# Patient Record
Sex: FEMALE | Race: WHITE | Employment: UNEMPLOYED | URBAN - METROPOLITAN AREA
[De-identification: names, ages, dates, MRNs, and addresses within clinical notes are randomized per-mention and may not be internally consistent; named-entity substitution may affect disease eponyms.]

---

## 2017-01-03 ENCOUNTER — HOSPITAL ENCOUNTER (EMERGENCY)
Facility: HOSPITAL | Age: 27
Discharge: HOME | End: 2017-01-04
Attending: EMERGENCY MEDICINE

## 2017-01-03 DIAGNOSIS — N12 PYELONEPHRITIS: Primary | ICD-10-CM

## 2017-01-03 PROCEDURE — 96376 TX/PRO/DX INJ SAME DRUG ADON: CPT

## 2017-01-03 PROCEDURE — 96374 THER/PROPH/DIAG INJ IV PUSH: CPT

## 2017-01-03 PROCEDURE — 81025 URINE PREGNANCY TEST: CPT

## 2017-01-03 PROCEDURE — 96375 TX/PRO/DX INJ NEW DRUG ADDON: CPT

## 2017-01-03 PROCEDURE — 96365 THER/PROPH/DIAG IV INF INIT: CPT

## 2017-01-03 PROCEDURE — 96366 THER/PROPH/DIAG IV INF ADDON: CPT

## 2017-01-03 PROCEDURE — 99284 EMERGENCY DEPT VISIT MOD MDM: CPT

## 2017-01-03 RX ORDER — SULFAMETHOXAZOLE AND TRIMETHOPRIM 400; 80 MG/1; MG/1
1 TABLET ORAL 2 TIMES DAILY
COMMUNITY
End: 2017-01-09 | Stop reason: DRUGHIGH

## 2017-01-04 ENCOUNTER — APPOINTMENT (OUTPATIENT)
Dept: CT IMAGING | Facility: HOSPITAL | Age: 27
End: 2017-01-04
Attending: EMERGENCY MEDICINE

## 2017-01-04 VITALS
SYSTOLIC BLOOD PRESSURE: 106 MMHG | BODY MASS INDEX: 25.9 KG/M2 | WEIGHT: 165 LBS | RESPIRATION RATE: 16 BRPM | OXYGEN SATURATION: 97 % | TEMPERATURE: 99 F | HEIGHT: 67 IN | DIASTOLIC BLOOD PRESSURE: 67 MMHG | HEART RATE: 95 BPM

## 2017-01-04 LAB
ALBUMIN SERPL-MCNC: 4.1 G/DL (ref 3.5–4.8)
ALP LIVER SERPL-CCNC: 42 U/L (ref 37–98)
ALT SERPL-CCNC: 46 U/L (ref 14–54)
AST SERPL-CCNC: 18 U/L (ref 15–41)
BASOPHILS # BLD AUTO: 0.02 X10(3) UL (ref 0–0.1)
BASOPHILS NFR BLD AUTO: 0.2 %
BILIRUB SERPL-MCNC: 0.4 MG/DL (ref 0.1–2)
BILIRUB UR QL STRIP.AUTO: NEGATIVE
BUN BLD-MCNC: 11 MG/DL (ref 8–20)
CALCIUM BLD-MCNC: 8.7 MG/DL (ref 8.3–10.3)
CHLORIDE: 105 MMOL/L (ref 101–111)
CO2: 29 MMOL/L (ref 22–32)
COLOR UR AUTO: YELLOW
CREAT BLD-MCNC: 0.63 MG/DL (ref 0.55–1.02)
EOSINOPHIL # BLD AUTO: 0.05 X10(3) UL (ref 0–0.3)
EOSINOPHIL NFR BLD AUTO: 0.4 %
ERYTHROCYTE [DISTWIDTH] IN BLOOD BY AUTOMATED COUNT: 12.1 % (ref 11.5–16)
GLUCOSE BLD-MCNC: 116 MG/DL (ref 70–99)
GLUCOSE UR STRIP.AUTO-MCNC: NEGATIVE MG/DL
HCG QUANTITATIVE: <1 MIU/ML (ref ?–1)
HCT VFR BLD AUTO: 37.1 % (ref 34–50)
HGB BLD-MCNC: 13 G/DL (ref 12–16)
IMMATURE GRANULOCYTE COUNT: 0.03 X10(3) UL (ref 0–1)
IMMATURE GRANULOCYTE RATIO %: 0.3 %
KETONES UR STRIP.AUTO-MCNC: NEGATIVE MG/DL
LIPASE: 106 U/L (ref 73–393)
LYMPHOCYTES # BLD AUTO: 1.15 X10(3) UL (ref 0.9–4)
LYMPHOCYTES NFR BLD AUTO: 10.1 %
M PROTEIN MFR SERPL ELPH: 7.3 G/DL (ref 6.1–8.3)
MCH RBC QN AUTO: 30.7 PG (ref 27–33.2)
MCHC RBC AUTO-ENTMCNC: 35 G/DL (ref 31–37)
MCV RBC AUTO: 87.7 FL (ref 81–100)
MONOCYTES # BLD AUTO: 0.62 X10(3) UL (ref 0.1–0.6)
MONOCYTES NFR BLD AUTO: 5.4 %
NEUTROPHIL ABS PRELIM: 9.56 X10 (3) UL (ref 1.3–6.7)
NEUTROPHILS # BLD AUTO: 9.56 X10(3) UL (ref 1.3–6.7)
NEUTROPHILS NFR BLD AUTO: 83.6 %
NITRITE UR QL STRIP.AUTO: POSITIVE
PH UR STRIP.AUTO: 7 [PH] (ref 4.5–8)
PLATELET # BLD AUTO: 155 10(3)UL (ref 150–450)
POCT URINE PREGNANCY: NEGATIVE
POTASSIUM SERPL-SCNC: 3.7 MMOL/L (ref 3.6–5.1)
PROT UR STRIP.AUTO-MCNC: 100 MG/DL
RBC # BLD AUTO: 4.23 X10(6)UL (ref 3.8–5.1)
RBC #/AREA URNS AUTO: >10 /HPF
RED CELL DISTRIBUTION WIDTH-SD: 38.7 FL (ref 35.1–46.3)
SODIUM SERPL-SCNC: 140 MMOL/L (ref 136–144)
SP GR UR STRIP.AUTO: 1.01 (ref 1–1.03)
UROBILINOGEN UR STRIP.AUTO-MCNC: <2 MG/DL
WBC # BLD AUTO: 11.4 X10(3) UL (ref 4–13)
WBC #/AREA URNS AUTO: >50 /HPF
YEAST URINE: PRESENT

## 2017-01-04 PROCEDURE — 80053 COMPREHEN METABOLIC PANEL: CPT | Performed by: EMERGENCY MEDICINE

## 2017-01-04 PROCEDURE — 85025 COMPLETE CBC W/AUTO DIFF WBC: CPT | Performed by: EMERGENCY MEDICINE

## 2017-01-04 PROCEDURE — 87088 URINE BACTERIA CULTURE: CPT | Performed by: EMERGENCY MEDICINE

## 2017-01-04 PROCEDURE — 84703 CHORIONIC GONADOTROPIN ASSAY: CPT | Performed by: EMERGENCY MEDICINE

## 2017-01-04 PROCEDURE — 83690 ASSAY OF LIPASE: CPT | Performed by: EMERGENCY MEDICINE

## 2017-01-04 PROCEDURE — 87086 URINE CULTURE/COLONY COUNT: CPT | Performed by: EMERGENCY MEDICINE

## 2017-01-04 PROCEDURE — 87186 SC STD MICRODIL/AGAR DIL: CPT | Performed by: EMERGENCY MEDICINE

## 2017-01-04 PROCEDURE — 74176 CT ABD & PELVIS W/O CONTRAST: CPT

## 2017-01-04 PROCEDURE — 81001 URINALYSIS AUTO W/SCOPE: CPT | Performed by: EMERGENCY MEDICINE

## 2017-01-04 RX ORDER — ONDANSETRON 2 MG/ML
4 INJECTION INTRAMUSCULAR; INTRAVENOUS ONCE
Status: COMPLETED | OUTPATIENT
Start: 2017-01-04 | End: 2017-01-04

## 2017-01-04 RX ORDER — HYDROCODONE BITARTRATE AND ACETAMINOPHEN 5; 325 MG/1; MG/1
1-2 TABLET ORAL EVERY 4 HOURS PRN
Qty: 20 TABLET | Refills: 0 | Status: SHIPPED | OUTPATIENT
Start: 2017-01-04 | End: 2017-01-09 | Stop reason: ALTCHOICE

## 2017-01-04 RX ORDER — SULFAMETHOXAZOLE AND TRIMETHOPRIM 800; 160 MG/1; MG/1
1 TABLET ORAL 2 TIMES DAILY
Qty: 6 TABLET | Refills: 0 | Status: SHIPPED | OUTPATIENT
Start: 2017-01-04 | End: 2017-01-08

## 2017-01-04 RX ORDER — ONDANSETRON 2 MG/ML
4 INJECTION INTRAMUSCULAR; INTRAVENOUS ONCE
Status: DISCONTINUED | OUTPATIENT
Start: 2017-01-04 | End: 2017-01-04

## 2017-01-04 RX ORDER — HYDROMORPHONE HYDROCHLORIDE 1 MG/ML
1 INJECTION, SOLUTION INTRAMUSCULAR; INTRAVENOUS; SUBCUTANEOUS EVERY 30 MIN PRN
Status: DISCONTINUED | OUTPATIENT
Start: 2017-01-04 | End: 2017-01-04

## 2017-01-04 NOTE — ED PROVIDER NOTES
Patient Seen in: [facility] Emergency Department    History   Patient presents with:  Abdomen/Flank Pain (GI/)  Urinary Symptoms (urologic)    Stated Complaint: abd/back pain- recent uti    HPI    Patient is a 69-year-old female comes in to emerge uti  Other systems are as noted in HPI. Constitutional and vital signs reviewed. All other systems reviewed and negative except as noted above. PSFH elements reviewed from today and agreed except as otherwise stated in HPI.     Physical Exam Nitrite Urine Positive (*)     Leukocyte Esterase Urine Moderate (*)     WBC Urine >50 (*)     RBC URINE >10 (*)     Bacteria Urine Rare (*)     Squamous Epi.  Cells Moderate (*)     Yeast Urine Present (*)     All other components within normal limits Add-vantage (0 g Intravenous Stopped 1/4/17 6614)       MDM     28-year-old female with pyelonephritis. No evidence for stone. Patient is going to be given Norco for home. Continue Bactrim.   She was given prescription for 4 more days of Bactrim to prolo

## 2017-01-04 NOTE — ED NOTES
Assumed care of patient, awaiting ct scan. States pain to abdomen is resolved, still with pain to right lower back 4/10.

## 2017-01-04 NOTE — ED INITIAL ASSESSMENT (HPI)
Pt with recent uti, now with right lower back pain. Seen at immediate care, urine checked, started on antibiotic x 1 dose. +nausea, denies emesis.

## 2017-01-09 ENCOUNTER — OFFICE VISIT (OUTPATIENT)
Dept: INTERNAL MEDICINE CLINIC | Facility: CLINIC | Age: 27
End: 2017-01-09

## 2017-01-09 VITALS
WEIGHT: 168 LBS | HEIGHT: 67 IN | SYSTOLIC BLOOD PRESSURE: 110 MMHG | TEMPERATURE: 98 F | DIASTOLIC BLOOD PRESSURE: 64 MMHG | RESPIRATION RATE: 12 BRPM | BODY MASS INDEX: 26.37 KG/M2 | HEART RATE: 74 BPM | OXYGEN SATURATION: 97 %

## 2017-01-09 DIAGNOSIS — N12 PYELONEPHRITIS: Primary | ICD-10-CM

## 2017-01-09 PROCEDURE — 99213 OFFICE O/P EST LOW 20 MIN: CPT | Performed by: INTERNAL MEDICINE

## 2017-01-09 RX ORDER — TRAMADOL HYDROCHLORIDE 50 MG/1
50 TABLET ORAL EVERY 6 HOURS PRN
Qty: 30 TABLET | Refills: 1 | Status: SHIPPED | OUTPATIENT
Start: 2017-01-09 | End: 2017-05-22

## 2017-01-09 RX ORDER — SULFAMETHOXAZOLE AND TRIMETHOPRIM 800; 160 MG/1; MG/1
1 TABLET ORAL 2 TIMES DAILY
Qty: 10 TABLET | Refills: 0 | Status: SHIPPED | OUTPATIENT
Start: 2017-01-09 | End: 2017-01-14

## 2017-01-09 NOTE — PROGRESS NOTES
Anabela Noel is a 23year old female. HPI:   Patient presents with:  Er F/u: kidney problems  Patient presents for follow up from her ED visit on 1/3/16. She had polyuria/dysuria, CVA tenderness at that time.   CT scan, UA were consistent with cyst : 170 lb  04/13/15 : 168 lb    EXAM:   /64 mmHg  Pulse 74  Temp(Src) 98.2 °F (36.8 °C) (Oral)  Resp 12  Ht 67\"  Wt 168 lb  BMI 26.31 kg/m2  SpO2 97%  LMP 01/03/2017  GENERAL: Alert and oriented, well developed, well nourished,in no apparent distress

## 2017-01-09 NOTE — PATIENT INSTRUCTIONS
For your urinary infection/back pain:  - we will do a longer course of antibiotics, at a higher dose. Take 1 tablet twice daily for the next 5 days. - I have also prescribed a different pain medication (Tramadol). Take 1 tablet every 8 hours as needed.

## 2017-05-22 ENCOUNTER — OFFICE VISIT (OUTPATIENT)
Dept: INTERNAL MEDICINE CLINIC | Facility: CLINIC | Age: 27
End: 2017-05-22

## 2017-05-22 VITALS
OXYGEN SATURATION: 99 % | SYSTOLIC BLOOD PRESSURE: 110 MMHG | BODY MASS INDEX: 28.7 KG/M2 | HEART RATE: 56 BPM | DIASTOLIC BLOOD PRESSURE: 68 MMHG | HEIGHT: 65 IN | WEIGHT: 172.25 LBS | TEMPERATURE: 99 F | RESPIRATION RATE: 12 BRPM

## 2017-05-22 DIAGNOSIS — R41.840 LACK OF CONCENTRATION: ICD-10-CM

## 2017-05-22 DIAGNOSIS — Z00.00 LABORATORY EXAMINATION ORDERED AS PART OF A ROUTINE GENERAL MEDICAL EXAMINATION: ICD-10-CM

## 2017-05-22 DIAGNOSIS — Z00.00 ROUTINE GENERAL MEDICAL EXAMINATION AT A HEALTH CARE FACILITY: Primary | ICD-10-CM

## 2017-05-22 PROCEDURE — 99395 PREV VISIT EST AGE 18-39: CPT | Performed by: FAMILY MEDICINE

## 2017-05-22 NOTE — PROGRESS NOTES
HPI:   Gabriela Leonardo is a 33year old female who presents for a complete physical exam. Pt sees a gyne for her pap smears . Patient complains of possible concentration, focus issues. Pt states in the past month it has gotten worse.  Pt is stressed, plann History   Problem Relation Age of Onset   • Cancer Maternal Grandmother    • Other[other] [OTHER] Paternal Grandfather       Social History:     Smoking Status: Never Smoker                      Smokeless Status: Never Used                        Alcohol U physical exam.  Self breast exam explained. Health maintenance, will check fasting Labs. Await results, if all wnl, will refer for ADD evaluation.   Pt info handouts given for: exercise, low fat diet and breast self-exam. Pt' s weight is Body mass index is

## 2017-05-26 ENCOUNTER — LAB ENCOUNTER (OUTPATIENT)
Dept: LAB | Age: 27
End: 2017-05-26
Attending: FAMILY MEDICINE
Payer: COMMERCIAL

## 2017-05-26 DIAGNOSIS — Z00.00 LABORATORY EXAMINATION ORDERED AS PART OF A ROUTINE GENERAL MEDICAL EXAMINATION: ICD-10-CM

## 2017-05-26 PROCEDURE — 80061 LIPID PANEL: CPT | Performed by: FAMILY MEDICINE

## 2017-05-26 PROCEDURE — 80050 GENERAL HEALTH PANEL: CPT | Performed by: FAMILY MEDICINE

## 2017-05-26 PROCEDURE — 82306 VITAMIN D 25 HYDROXY: CPT | Performed by: FAMILY MEDICINE

## 2017-05-26 PROCEDURE — 83036 HEMOGLOBIN GLYCOSYLATED A1C: CPT | Performed by: FAMILY MEDICINE

## 2017-05-26 PROCEDURE — 36415 COLL VENOUS BLD VENIPUNCTURE: CPT | Performed by: FAMILY MEDICINE

## 2017-12-15 PROCEDURE — 87491 CHLMYD TRACH DNA AMP PROBE: CPT | Performed by: OBSTETRICS & GYNECOLOGY

## 2017-12-15 PROCEDURE — 88175 CYTOPATH C/V AUTO FLUID REDO: CPT | Performed by: OBSTETRICS & GYNECOLOGY

## 2017-12-15 PROCEDURE — 36415 COLL VENOUS BLD VENIPUNCTURE: CPT | Performed by: OBSTETRICS & GYNECOLOGY

## 2017-12-15 PROCEDURE — 86780 TREPONEMA PALLIDUM: CPT | Performed by: OBSTETRICS & GYNECOLOGY

## 2017-12-15 PROCEDURE — 86900 BLOOD TYPING SEROLOGIC ABO: CPT | Performed by: OBSTETRICS & GYNECOLOGY

## 2017-12-15 PROCEDURE — 87389 HIV-1 AG W/HIV-1&-2 AB AG IA: CPT | Performed by: OBSTETRICS & GYNECOLOGY

## 2017-12-15 PROCEDURE — 87086 URINE CULTURE/COLONY COUNT: CPT | Performed by: OBSTETRICS & GYNECOLOGY

## 2017-12-15 PROCEDURE — 86850 RBC ANTIBODY SCREEN: CPT | Performed by: OBSTETRICS & GYNECOLOGY

## 2017-12-15 PROCEDURE — 86901 BLOOD TYPING SEROLOGIC RH(D): CPT | Performed by: OBSTETRICS & GYNECOLOGY

## 2017-12-15 PROCEDURE — 85025 COMPLETE CBC W/AUTO DIFF WBC: CPT | Performed by: OBSTETRICS & GYNECOLOGY

## 2017-12-15 PROCEDURE — 86762 RUBELLA ANTIBODY: CPT | Performed by: OBSTETRICS & GYNECOLOGY

## 2017-12-15 PROCEDURE — 87591 N.GONORRHOEAE DNA AMP PROB: CPT | Performed by: OBSTETRICS & GYNECOLOGY

## 2017-12-15 PROCEDURE — 87340 HEPATITIS B SURFACE AG IA: CPT | Performed by: OBSTETRICS & GYNECOLOGY

## 2018-08-16 ENCOUNTER — OFFICE VISIT (OUTPATIENT)
Dept: INTERNAL MEDICINE CLINIC | Facility: CLINIC | Age: 28
End: 2018-08-16
Payer: COMMERCIAL

## 2018-08-16 VITALS
BODY MASS INDEX: 28 KG/M2 | DIASTOLIC BLOOD PRESSURE: 64 MMHG | HEART RATE: 81 BPM | TEMPERATURE: 99 F | RESPIRATION RATE: 16 BRPM | OXYGEN SATURATION: 98 % | WEIGHT: 170 LBS | SYSTOLIC BLOOD PRESSURE: 106 MMHG

## 2018-08-16 DIAGNOSIS — J01.90 ACUTE SINUSITIS, RECURRENCE NOT SPECIFIED, UNSPECIFIED LOCATION: ICD-10-CM

## 2018-08-16 DIAGNOSIS — J02.9 SORE THROAT: Primary | ICD-10-CM

## 2018-08-16 LAB
CONTROL LINE PRESENT WITH A CLEAR BACKGROUND (YES/NO): YES YES/NO
STREP GRP A CUL-SCR: NEGATIVE

## 2018-08-16 PROCEDURE — 87880 STREP A ASSAY W/OPTIC: CPT | Performed by: FAMILY MEDICINE

## 2018-08-16 PROCEDURE — 99213 OFFICE O/P EST LOW 20 MIN: CPT | Performed by: FAMILY MEDICINE

## 2018-08-16 RX ORDER — BENZONATATE 200 MG/1
200 CAPSULE ORAL 3 TIMES DAILY PRN
Qty: 30 CAPSULE | Refills: 0 | Status: SHIPPED | OUTPATIENT
Start: 2018-08-16 | End: 2019-03-30 | Stop reason: ALTCHOICE

## 2018-08-16 RX ORDER — AZITHROMYCIN 250 MG/1
TABLET, FILM COATED ORAL
Qty: 6 TABLET | Refills: 0 | Status: SHIPPED | OUTPATIENT
Start: 2018-08-16 | End: 2019-03-30 | Stop reason: ALTCHOICE

## 2018-08-16 NOTE — PROGRESS NOTES
CHIEF COMPLAINT:   Patient presents with:  Sore Throat: since 8/8/18. Woke up this morning feeling hot and tired. HPI:   Mitchell Aj is a 33year old female who presents for upper respiratory symptoms for  8 days.  Patient reports sore throa Nostrils patent,minimal cloudy nasal discharge more PND, nasal mucosa deep pink  THROAT: Oral mucosa pink, moist. Posterior pharynx is + erythematous. PND, no exudates.  Rapid strept negative    NECK: Supple, non-tender  LUNGS: clear to auscultation bilater

## 2018-11-17 ENCOUNTER — LAB ENCOUNTER (OUTPATIENT)
Dept: LAB | Age: 28
End: 2018-11-17
Attending: OBSTETRICS & GYNECOLOGY
Payer: COMMERCIAL

## 2018-11-17 DIAGNOSIS — O20.0 THREATENED ABORTION, ANTEPARTUM: ICD-10-CM

## 2018-11-17 PROCEDURE — 36415 COLL VENOUS BLD VENIPUNCTURE: CPT

## 2018-11-17 PROCEDURE — 84702 CHORIONIC GONADOTROPIN TEST: CPT

## 2018-11-17 PROCEDURE — 86850 RBC ANTIBODY SCREEN: CPT

## 2018-11-17 PROCEDURE — 86901 BLOOD TYPING SEROLOGIC RH(D): CPT

## 2018-11-17 PROCEDURE — 84144 ASSAY OF PROGESTERONE: CPT

## 2018-11-17 PROCEDURE — 86900 BLOOD TYPING SEROLOGIC ABO: CPT

## 2018-11-19 ENCOUNTER — APPOINTMENT (OUTPATIENT)
Dept: LAB | Age: 28
End: 2018-11-19
Attending: OBSTETRICS & GYNECOLOGY
Payer: COMMERCIAL

## 2018-11-19 DIAGNOSIS — Z32.01 PREGNANCY EXAMINATION OR TEST, POSITIVE RESULT: ICD-10-CM

## 2018-11-19 PROCEDURE — 36415 COLL VENOUS BLD VENIPUNCTURE: CPT

## 2018-11-19 PROCEDURE — 84702 CHORIONIC GONADOTROPIN TEST: CPT

## 2018-12-19 ENCOUNTER — LAB ENCOUNTER (OUTPATIENT)
Dept: LAB | Age: 28
End: 2018-12-19
Attending: OBSTETRICS & GYNECOLOGY
Payer: COMMERCIAL

## 2018-12-19 DIAGNOSIS — Z34.81 ENCOUNTER FOR SUPERVISION OF OTHER NORMAL PREGNANCY IN FIRST TRIMESTER: ICD-10-CM

## 2018-12-19 PROBLEM — O43.109: Status: ACTIVE | Noted: 2018-12-19

## 2018-12-19 PROBLEM — O43.109 PLACENTAL CONDITION IN PREGNANCY: Status: ACTIVE | Noted: 2018-12-19

## 2018-12-19 PROCEDURE — 87086 URINE CULTURE/COLONY COUNT: CPT | Performed by: OBSTETRICS & GYNECOLOGY

## 2018-12-19 PROCEDURE — 86900 BLOOD TYPING SEROLOGIC ABO: CPT

## 2018-12-19 PROCEDURE — 87491 CHLMYD TRACH DNA AMP PROBE: CPT | Performed by: OBSTETRICS & GYNECOLOGY

## 2018-12-19 PROCEDURE — 86780 TREPONEMA PALLIDUM: CPT

## 2018-12-19 PROCEDURE — 87591 N.GONORRHOEAE DNA AMP PROB: CPT | Performed by: OBSTETRICS & GYNECOLOGY

## 2018-12-19 PROCEDURE — 87389 HIV-1 AG W/HIV-1&-2 AB AG IA: CPT

## 2018-12-19 PROCEDURE — 36415 COLL VENOUS BLD VENIPUNCTURE: CPT

## 2018-12-19 PROCEDURE — 86762 RUBELLA ANTIBODY: CPT

## 2018-12-19 PROCEDURE — 86850 RBC ANTIBODY SCREEN: CPT

## 2018-12-19 PROCEDURE — 85025 COMPLETE CBC W/AUTO DIFF WBC: CPT

## 2018-12-19 PROCEDURE — 86901 BLOOD TYPING SEROLOGIC RH(D): CPT

## 2018-12-19 PROCEDURE — 87340 HEPATITIS B SURFACE AG IA: CPT

## 2019-01-16 ENCOUNTER — LAB ENCOUNTER (OUTPATIENT)
Dept: LAB | Age: 29
End: 2019-01-16
Attending: OBSTETRICS & GYNECOLOGY
Payer: COMMERCIAL

## 2019-01-16 DIAGNOSIS — Z36.0 SCREENING FOR CHROMOSOMAL ANOMALIES BY AMNIOCENTESIS: Primary | ICD-10-CM

## 2019-02-13 ENCOUNTER — APPOINTMENT (OUTPATIENT)
Dept: LAB | Age: 29
End: 2019-02-13
Attending: OBSTETRICS & GYNECOLOGY
Payer: COMMERCIAL

## 2019-02-13 DIAGNOSIS — Z34.82 ENCOUNTER FOR SUPERVISION OF OTHER NORMAL PREGNANCY IN SECOND TRIMESTER: ICD-10-CM

## 2019-02-13 PROBLEM — Z14.8 GENETIC CARRIER OF OTHER DISEASE: Status: ACTIVE | Noted: 2019-02-13

## 2019-02-13 PROCEDURE — 82105 ALPHA-FETOPROTEIN SERUM: CPT

## 2019-02-13 PROCEDURE — 36415 COLL VENOUS BLD VENIPUNCTURE: CPT

## 2019-02-16 LAB
AFP SMOKING: NO
FAMILY HX NEURAL TUBE DEFECT: NO
INSULIN REQ MATERNAL DIABETES: NO
MATERNAL AGE OF DELIVERY: 28.2 YR
MOM FOR AFP: 1.15
PATIENT'S AFP: 34 NG/ML

## 2019-03-28 PROBLEM — O41.8X10 SUBCHORIONIC HEMORRHAGE OF PLACENTA IN FIRST TRIMESTER: Status: ACTIVE | Noted: 2019-03-28

## 2019-03-28 PROBLEM — O20.8 SUBCHORIONIC HEMORRHAGE OF PLACENTA IN FIRST TRIMESTER (HCC): Status: ACTIVE | Noted: 2019-03-28

## 2019-03-28 PROBLEM — O46.8X1 SUBCHORIONIC HEMORRHAGE OF PLACENTA IN FIRST TRIMESTER: Status: ACTIVE | Noted: 2019-03-28

## 2019-03-28 PROBLEM — O20.8 SUBCHORIONIC HEMORRHAGE OF PLACENTA IN FIRST TRIMESTER: Status: ACTIVE | Noted: 2019-03-28

## 2019-03-30 ENCOUNTER — OFFICE VISIT (OUTPATIENT)
Dept: INTERNAL MEDICINE CLINIC | Facility: CLINIC | Age: 29
End: 2019-03-30
Payer: COMMERCIAL

## 2019-03-30 VITALS
SYSTOLIC BLOOD PRESSURE: 112 MMHG | HEART RATE: 86 BPM | RESPIRATION RATE: 16 BRPM | OXYGEN SATURATION: 100 % | BODY MASS INDEX: 33 KG/M2 | DIASTOLIC BLOOD PRESSURE: 66 MMHG | WEIGHT: 207 LBS | TEMPERATURE: 98 F

## 2019-03-30 DIAGNOSIS — Z3A.23 23 WEEKS GESTATION OF PREGNANCY: ICD-10-CM

## 2019-03-30 DIAGNOSIS — R19.7 DIARRHEA OF PRESUMED INFECTIOUS ORIGIN: Primary | ICD-10-CM

## 2019-03-30 DIAGNOSIS — Z20.821 EXPOSURE TO ZIKA VIRUS: ICD-10-CM

## 2019-03-30 PROCEDURE — 99213 OFFICE O/P EST LOW 20 MIN: CPT | Performed by: NURSE PRACTITIONER

## 2019-03-30 NOTE — PROGRESS NOTES
Sharlene Pedroza is a 33year old female who presents with . HPI:   Symptoms for 3 weeks, started the day after she returned from [facility]. Was having some loose stools while in [facility] as well.  Drank mostly bottled water however had beverages with ice MG Oral Cap Take 1 capsule by mouth daily.  Disp:  Rfl:       Past Medical History:   Diagnosis Date   • Anemia     Hx Fe def anemia   • Asthma     as a child   • Chronic rhinitis    • Extrinsic asthma, unspecified    • Gastrointestinal disorder     celiac Future  Patient instructions include:    Push fluids: small amounts at a time frequently (water, Gatorade, Pedialyte, Ginger Ale)  Avoid sugary beverages and dairy products until condition is resolved  Monitor urination; urine should be light/pale yellow

## 2019-04-01 ENCOUNTER — APPOINTMENT (OUTPATIENT)
Dept: LAB | Age: 29
End: 2019-04-01
Attending: NURSE PRACTITIONER
Payer: COMMERCIAL

## 2019-04-01 DIAGNOSIS — Z20.821 EXPOSURE TO ZIKA VIRUS: ICD-10-CM

## 2019-04-01 DIAGNOSIS — Z3A.23 23 WEEKS GESTATION OF PREGNANCY: ICD-10-CM

## 2019-04-01 PROCEDURE — 87662 ZIKA VIRUS DNA/RNA AMP PROBE: CPT | Performed by: NURSE PRACTITIONER

## 2019-04-01 PROCEDURE — 36415 COLL VENOUS BLD VENIPUNCTURE: CPT | Performed by: NURSE PRACTITIONER

## 2019-04-02 ENCOUNTER — LABORATORY ENCOUNTER (OUTPATIENT)
Dept: LAB | Age: 29
End: 2019-04-02
Attending: NURSE PRACTITIONER
Payer: COMMERCIAL

## 2019-04-02 DIAGNOSIS — R19.7 DIARRHEA OF PRESUMED INFECTIOUS ORIGIN: ICD-10-CM

## 2019-04-02 PROCEDURE — 87272 CRYPTOSPORIDIUM AG IF: CPT | Performed by: NURSE PRACTITIONER

## 2019-04-02 PROCEDURE — 87209 SMEAR COMPLEX STAIN: CPT | Performed by: NURSE PRACTITIONER

## 2019-04-02 PROCEDURE — 87177 OVA AND PARASITES SMEARS: CPT | Performed by: NURSE PRACTITIONER

## 2019-04-02 PROCEDURE — 87329 GIARDIA AG IA: CPT | Performed by: NURSE PRACTITIONER

## 2019-04-24 ENCOUNTER — LAB ENCOUNTER (OUTPATIENT)
Dept: LAB | Age: 29
End: 2019-04-24
Attending: OBSTETRICS & GYNECOLOGY
Payer: COMMERCIAL

## 2019-04-24 DIAGNOSIS — Z34.82 ENCOUNTER FOR SUPERVISION OF OTHER NORMAL PREGNANCY IN SECOND TRIMESTER: ICD-10-CM

## 2019-04-24 PROCEDURE — 85025 COMPLETE CBC W/AUTO DIFF WBC: CPT

## 2019-04-24 PROCEDURE — 82950 GLUCOSE TEST: CPT

## 2019-04-24 PROCEDURE — 36415 COLL VENOUS BLD VENIPUNCTURE: CPT

## 2019-05-04 ENCOUNTER — HOSPITAL ENCOUNTER (OUTPATIENT)
Facility: HOSPITAL | Age: 29
Setting detail: OBSERVATION
Discharge: HOME | End: 2019-05-04
Attending: OBSTETRICS & GYNECOLOGY | Admitting: OBSTETRICS & GYNECOLOGY
Payer: COMMERCIAL

## 2019-05-04 ENCOUNTER — APPOINTMENT (OUTPATIENT)
Dept: ULTRASOUND IMAGING | Facility: HOSPITAL | Age: 29
End: 2019-05-04
Attending: OBSTETRICS & GYNECOLOGY
Payer: COMMERCIAL

## 2019-05-04 VITALS
HEIGHT: 66 IN | SYSTOLIC BLOOD PRESSURE: 119 MMHG | WEIGHT: 212 LBS | TEMPERATURE: 98 F | HEART RATE: 87 BPM | BODY MASS INDEX: 34.07 KG/M2 | RESPIRATION RATE: 16 BRPM | DIASTOLIC BLOOD PRESSURE: 68 MMHG

## 2019-05-04 PROBLEM — Z34.90 PREGNANCY (HCC): Status: ACTIVE | Noted: 2019-05-04

## 2019-05-04 PROBLEM — Z34.90 PREGNANCY: Status: ACTIVE | Noted: 2019-05-04

## 2019-05-04 PROCEDURE — 76819 FETAL BIOPHYS PROFIL W/O NST: CPT | Performed by: OBSTETRICS & GYNECOLOGY

## 2019-05-04 PROCEDURE — 99213 OFFICE O/P EST LOW 20 MIN: CPT

## 2019-05-04 NOTE — PROGRESS NOTES
Pt is a 30year old female admitted to TRG2/TRG2-A. Patient presents with:  Decreased Fetal Movement     Pt is  28w1d intra-uterine pregnancy. History obtained, consents signed. Oriented to room, staff, and plan of care.

## 2019-05-05 NOTE — PROGRESS NOTES
Patient reports feeling fetal movement. Verbal and written discharge instructions given. Patient verbalizes understanding. Patient discharged home undelivered.

## 2019-05-05 NOTE — PROGRESS NOTES
Patient resting in bed. Plan of care discussed and questions answered. Patient states she is feeling movement, just not as much as usual.  Juice given.

## 2019-05-05 NOTE — NST
Nonstress Test   Patient: Arlys Long    Gestation: 28w1d    NST:       Variability: Moderate           Accelerations: Yes           Decelerations: Variable            Baseline: 140 BPM           Uterine Irritability: No

## 2019-06-19 ENCOUNTER — APPOINTMENT (OUTPATIENT)
Dept: LAB | Age: 29
End: 2019-06-19
Attending: OBSTETRICS & GYNECOLOGY
Payer: COMMERCIAL

## 2019-06-19 PROBLEM — R42 DIZZINESS: Status: ACTIVE | Noted: 2019-06-19

## 2019-07-03 PROCEDURE — 87653 STREP B DNA AMP PROBE: CPT | Performed by: NURSE PRACTITIONER

## 2019-07-03 PROCEDURE — 87081 CULTURE SCREEN ONLY: CPT | Performed by: NURSE PRACTITIONER

## 2019-07-10 PROBLEM — B95.1 GROUP BETA STREP POSITIVE: Status: ACTIVE | Noted: 2019-07-10

## 2019-07-11 PROBLEM — O09.299 H/O MACROSOMIA IN INFANT IN PRIOR PREGNANCY, CURRENTLY PREGNANT (HCC): Status: ACTIVE | Noted: 2019-07-11

## 2019-07-11 PROBLEM — O43.109: Status: RESOLVED | Noted: 2018-12-19 | Resolved: 2019-07-11

## 2019-07-11 PROBLEM — O09.299 H/O MACROSOMIA IN INFANT IN PRIOR PREGNANCY, CURRENTLY PREGNANT: Status: ACTIVE | Noted: 2019-07-11

## 2019-07-11 PROBLEM — O43.109 PLACENTAL CONDITION IN PREGNANCY: Status: RESOLVED | Noted: 2018-12-19 | Resolved: 2019-07-11

## 2019-08-01 ENCOUNTER — TELEPHONE (OUTPATIENT)
Dept: OBGYN UNIT | Facility: HOSPITAL | Age: 29
End: 2019-08-01

## 2019-08-05 ENCOUNTER — TELEPHONE (OUTPATIENT)
Dept: OBGYN UNIT | Facility: HOSPITAL | Age: 29
End: 2019-08-05

## 2019-08-05 RX ORDER — MELATONIN
325
COMMUNITY
End: 2019-08-20 | Stop reason: ALTCHOICE

## 2019-08-05 RX ORDER — AMOXICILLIN 500 MG/1
500 CAPSULE ORAL 2 TIMES DAILY
COMMUNITY
End: 2019-08-20 | Stop reason: ALTCHOICE

## 2019-08-05 NOTE — PROGRESS NOTES
Pt given arrival instructions. RN reviewed induction procedures, general plan of care, and  pain medication option. Questions answered. Pt verbalizes understanding.

## 2019-08-06 ENCOUNTER — APPOINTMENT (OUTPATIENT)
Dept: OBGYN CLINIC | Facility: HOSPITAL | Age: 29
End: 2019-08-06
Payer: COMMERCIAL

## 2019-08-06 ENCOUNTER — HOSPITAL ENCOUNTER (INPATIENT)
Facility: HOSPITAL | Age: 29
LOS: 2 days | Discharge: HOME | End: 2019-08-08
Attending: OBSTETRICS & GYNECOLOGY | Admitting: OBSTETRICS & GYNECOLOGY
Payer: COMMERCIAL

## 2019-08-06 LAB
ANTIBODY SCREEN: NEGATIVE
DEPRECATED RDW RBC AUTO: 42.6 FL (ref 35.1–46.3)
ERYTHROCYTE [DISTWIDTH] IN BLOOD BY AUTOMATED COUNT: 13.1 % (ref 11–15)
HCT VFR BLD AUTO: 37.2 % (ref 35–48)
HGB BLD-MCNC: 12.5 G/DL (ref 12–16)
MCH RBC QN AUTO: 30.3 PG (ref 26–34)
MCHC RBC AUTO-ENTMCNC: 33.6 G/DL (ref 31–37)
MCV RBC AUTO: 90.1 FL (ref 80–100)
PLATELET # BLD AUTO: 128 10(3)UL (ref 150–450)
RBC # BLD AUTO: 4.13 X10(6)UL (ref 3.8–5.3)
RH BLOOD TYPE: POSITIVE
T PALLIDUM AB SER QL IA: NONREACTIVE
WBC # BLD AUTO: 7.9 X10(3) UL (ref 4–11)

## 2019-08-06 PROCEDURE — 85027 COMPLETE CBC AUTOMATED: CPT | Performed by: OBSTETRICS & GYNECOLOGY

## 2019-08-06 PROCEDURE — 0KQM0ZZ REPAIR PERINEUM MUSCLE, OPEN APPROACH: ICD-10-PCS | Performed by: OBSTETRICS & GYNECOLOGY

## 2019-08-06 PROCEDURE — 86900 BLOOD TYPING SEROLOGIC ABO: CPT | Performed by: OBSTETRICS & GYNECOLOGY

## 2019-08-06 PROCEDURE — 86780 TREPONEMA PALLIDUM: CPT | Performed by: OBSTETRICS & GYNECOLOGY

## 2019-08-06 PROCEDURE — 86901 BLOOD TYPING SEROLOGIC RH(D): CPT | Performed by: OBSTETRICS & GYNECOLOGY

## 2019-08-06 PROCEDURE — 86850 RBC ANTIBODY SCREEN: CPT | Performed by: OBSTETRICS & GYNECOLOGY

## 2019-08-06 PROCEDURE — 99214 OFFICE O/P EST MOD 30 MIN: CPT

## 2019-08-06 RX ORDER — DOCUSATE SODIUM 100 MG/1
100 CAPSULE, LIQUID FILLED ORAL
Status: DISCONTINUED | OUTPATIENT
Start: 2019-08-06 | End: 2019-08-08

## 2019-08-06 RX ORDER — IBUPROFEN 600 MG/1
600 TABLET ORAL EVERY 6 HOURS
Status: DISCONTINUED | OUTPATIENT
Start: 2019-08-06 | End: 2019-08-08

## 2019-08-06 RX ORDER — AMMONIA INHALANTS 0.04 G/.3ML
0.3 INHALANT RESPIRATORY (INHALATION) AS NEEDED
Status: DISCONTINUED | OUTPATIENT
Start: 2019-08-06 | End: 2019-08-06 | Stop reason: HOSPADM

## 2019-08-06 RX ORDER — NALBUPHINE HCL 10 MG/ML
2.5 AMPUL (ML) INJECTION
Status: DISCONTINUED | OUTPATIENT
Start: 2019-08-06 | End: 2019-08-06

## 2019-08-06 RX ORDER — ACETAMINOPHEN 325 MG/1
650 TABLET ORAL EVERY 6 HOURS PRN
Status: DISCONTINUED | OUTPATIENT
Start: 2019-08-06 | End: 2019-08-08

## 2019-08-06 RX ORDER — TRISODIUM CITRATE DIHYDRATE AND CITRIC ACID MONOHYDRATE 500; 334 MG/5ML; MG/5ML
30 SOLUTION ORAL AS NEEDED
Status: DISCONTINUED | OUTPATIENT
Start: 2019-08-06 | End: 2019-08-06 | Stop reason: HOSPADM

## 2019-08-06 RX ORDER — DEXTROSE, SODIUM CHLORIDE, SODIUM LACTATE, POTASSIUM CHLORIDE, AND CALCIUM CHLORIDE 5; .6; .31; .03; .02 G/100ML; G/100ML; G/100ML; G/100ML; G/100ML
INJECTION, SOLUTION INTRAVENOUS AS NEEDED
Status: DISCONTINUED | OUTPATIENT
Start: 2019-08-06 | End: 2019-08-06 | Stop reason: HOSPADM

## 2019-08-06 RX ORDER — ZOLPIDEM TARTRATE 5 MG/1
5 TABLET ORAL NIGHTLY PRN
Status: DISCONTINUED | OUTPATIENT
Start: 2019-08-06 | End: 2019-08-08

## 2019-08-06 RX ORDER — IBUPROFEN 600 MG/1
600 TABLET ORAL ONCE AS NEEDED
Status: DISCONTINUED | OUTPATIENT
Start: 2019-08-06 | End: 2019-08-06 | Stop reason: HOSPADM

## 2019-08-06 RX ORDER — BISACODYL 10 MG
10 SUPPOSITORY, RECTAL RECTAL ONCE AS NEEDED
Status: DISCONTINUED | OUTPATIENT
Start: 2019-08-06 | End: 2019-08-08

## 2019-08-06 RX ORDER — TERBUTALINE SULFATE 1 MG/ML
0.25 INJECTION, SOLUTION SUBCUTANEOUS AS NEEDED
Status: DISCONTINUED | OUTPATIENT
Start: 2019-08-06 | End: 2019-08-06 | Stop reason: HOSPADM

## 2019-08-06 RX ORDER — METHYLERGONOVINE MALEATE 0.2 MG/ML
INJECTION INTRAVENOUS
Status: COMPLETED
Start: 2019-08-06 | End: 2019-08-06

## 2019-08-06 RX ORDER — SODIUM CHLORIDE, SODIUM LACTATE, POTASSIUM CHLORIDE, CALCIUM CHLORIDE 600; 310; 30; 20 MG/100ML; MG/100ML; MG/100ML; MG/100ML
INJECTION, SOLUTION INTRAVENOUS CONTINUOUS
Status: DISCONTINUED | OUTPATIENT
Start: 2019-08-06 | End: 2019-08-06 | Stop reason: HOSPADM

## 2019-08-06 RX ORDER — EPHEDRINE SULFATE/0.9% NACL/PF 25 MG/5 ML
5 SYRINGE (ML) INTRAVENOUS AS NEEDED
Status: DISCONTINUED | OUTPATIENT
Start: 2019-08-06 | End: 2019-08-06

## 2019-08-06 RX ORDER — SIMETHICONE 80 MG
80 TABLET,CHEWABLE ORAL 3 TIMES DAILY PRN
Status: DISCONTINUED | OUTPATIENT
Start: 2019-08-06 | End: 2019-08-08

## 2019-08-06 RX ORDER — METHYLERGONOVINE MALEATE 0.2 MG/ML
0.2 INJECTION INTRAVENOUS ONCE
Status: COMPLETED | OUTPATIENT
Start: 2019-08-06 | End: 2019-08-06

## 2019-08-06 RX ORDER — ONDANSETRON 2 MG/ML
INJECTION INTRAMUSCULAR; INTRAVENOUS
Status: COMPLETED
Start: 2019-08-06 | End: 2019-08-06

## 2019-08-06 RX ORDER — ONDANSETRON 2 MG/ML
4 INJECTION INTRAMUSCULAR; INTRAVENOUS EVERY 6 HOURS PRN
Status: DISCONTINUED | OUTPATIENT
Start: 2019-08-06 | End: 2019-08-06

## 2019-08-06 NOTE — PROGRESS NOTES
Report received from [facility] CRITICAL [facility].  Patient received in stable condition

## 2019-08-06 NOTE — H&P
Lizeth Cain [facility] 298 Patient Status:  Inpatient    5/3/1990 MRN [id number]   Location [address] [facility] Attending Ross Foster MD   Hosp Day # 0 PCP Donaldo Chandra MD     Subjective:  Delayed complications have been discussed in detail with the patient. Pre-admission, admission, and post admission procedures and expectations were discussed in detail.   All questions answered, all appropriate consents will be signed at the Hospital. Admission is

## 2019-08-06 NOTE — PROGRESS NOTES
Pt up to void x2 since arrival in amts 200 cc & 100cc. Uterus 1/U-2U left. Strt cathed  for 100cc dk yellow urine. Pt encouraged to drink & will continue to monitor uterus and urine output.

## 2019-08-06 NOTE — PROGRESS NOTES
Patient up to bathroom with assist x 2. Voided 100  Patient transferred to mother/baby room 2196 per wheelchair in stable condition with baby and personal belongings. Accompanied by significant other and staff. Report given to [facility].

## 2019-08-06 NOTE — PROGRESS NOTES
NURSING ADMISSION NOTE      Patient admitted via Wheelchair  Oriented to room. Safety precautions initiated. Bed in low position. Call light in reach. Report received from [address]. ID bands checked by 2 RN's.

## 2019-08-06 NOTE — L&D DELIVERY NOTE
Vaginal Delivery Note          Rissa Fernandez Patient Status:  Inpatient    5/3/1990 MRN [id number]   Location [facility] [facility] Attending Joshua Monique MD   [facility] Day # interrupted subcuticular fashion. Rectal-vaginal exam was normal.     Bleeding was minimal.  The patient was then moved to the supine position in stable condition. Counts were correct. Complications:  None    Mother and infant in good condition.

## 2019-08-06 NOTE — PROGRESS NOTES
Admit Note    Presented in early labor - requesting an epidural  Tracing normal    Doing well now - comfortable with epidural  Tracing reviewed - moderate variability, no decelerations, contractions spontaneous q 3 - 4 minutes lasting 70 seconds.   Reassuri

## 2019-08-07 LAB
BASOPHILS # BLD AUTO: 0.01 X10(3) UL (ref 0–0.2)
BASOPHILS NFR BLD AUTO: 0.1 %
DEPRECATED RDW RBC AUTO: 43.7 FL (ref 35.1–46.3)
EOSINOPHIL # BLD AUTO: 0.08 X10(3) UL (ref 0–0.7)
EOSINOPHIL NFR BLD AUTO: 0.9 %
ERYTHROCYTE [DISTWIDTH] IN BLOOD BY AUTOMATED COUNT: 13.4 % (ref 11–15)
HCT VFR BLD AUTO: 27.9 % (ref 35–48)
HGB BLD-MCNC: 9.3 G/DL (ref 12–16)
IMM GRANULOCYTES # BLD AUTO: 0.04 X10(3) UL (ref 0–1)
IMM GRANULOCYTES NFR BLD: 0.5 %
LYMPHOCYTES # BLD AUTO: 1.56 X10(3) UL (ref 1–4)
LYMPHOCYTES NFR BLD AUTO: 18.3 %
MCH RBC QN AUTO: 30.2 PG (ref 26–34)
MCHC RBC AUTO-ENTMCNC: 33.3 G/DL (ref 31–37)
MCV RBC AUTO: 90.6 FL (ref 80–100)
MONOCYTES # BLD AUTO: 0.49 X10(3) UL (ref 0.1–1)
MONOCYTES NFR BLD AUTO: 5.7 %
NEUTROPHILS # BLD AUTO: 6.35 X10 (3) UL (ref 1.5–7.7)
NEUTROPHILS # BLD AUTO: 6.35 X10(3) UL (ref 1.5–7.7)
NEUTROPHILS NFR BLD AUTO: 74.5 %
PLATELET # BLD AUTO: 112 10(3)UL (ref 150–450)
RBC # BLD AUTO: 3.08 X10(6)UL (ref 3.8–5.3)
WBC # BLD AUTO: 8.5 X10(3) UL (ref 4–11)

## 2019-08-07 PROCEDURE — 85025 COMPLETE CBC W/AUTO DIFF WBC: CPT | Performed by: OBSTETRICS & GYNECOLOGY

## 2019-08-08 VITALS
WEIGHT: 220 LBS | BODY MASS INDEX: 34.53 KG/M2 | OXYGEN SATURATION: 98 % | SYSTOLIC BLOOD PRESSURE: 98 MMHG | RESPIRATION RATE: 20 BRPM | HEART RATE: 88 BPM | DIASTOLIC BLOOD PRESSURE: 50 MMHG | TEMPERATURE: 99 F | HEIGHT: 67 IN

## 2019-08-08 RX ORDER — IBUPROFEN 600 MG/1
600 TABLET ORAL 4 TIMES DAILY PRN
Qty: 30 TABLET | Refills: 1 | Status: SHIPPED | OUTPATIENT
Start: 2019-08-08 | End: 2019-08-20 | Stop reason: ALTCHOICE

## 2019-08-08 RX ORDER — ACETAMINOPHEN 325 MG/1
650 TABLET ORAL EVERY 6 HOURS PRN
Qty: 30 TABLET | Refills: 1 | Status: SHIPPED | OUTPATIENT
Start: 2019-08-08 | End: 2019-11-04

## 2019-08-08 NOTE — PROGRESS NOTES
[facility]  Post-Partum Progress Note    Arlys Long Patient Status:  Inpatient    5/3/1990 MRN [id number]   [facility] 2SW-J Attending Andreea Dickey MD   Hosp Day # 2 PCP Ladonna Ku MD     SUBJECTIVE:    Postpartum

## 2019-08-08 NOTE — DISCHARGE SUMMARY
[facility]  Discharge Summary    Luis Armando Winslow Patient Status:  Inpatient    5/3/1990 MRN [id number]   [facility] 2SW-J Attending Viola Billingsley MD   Hosp Day # 2 PCP Cherylyn Sandhoff, MD     Admit Date:  2019    EDC: Est

## 2019-08-12 ENCOUNTER — TELEPHONE (OUTPATIENT)
Dept: OBGYN UNIT | Facility: HOSPITAL | Age: 29
End: 2019-08-12

## 2019-08-12 ENCOUNTER — NURSE ONLY (OUTPATIENT)
Dept: LACTATION | Facility: HOSPITAL | Age: 29
End: 2019-08-12
Attending: OBSTETRICS & GYNECOLOGY
Payer: COMMERCIAL

## 2019-08-12 VITALS — TEMPERATURE: 99 F

## 2019-08-12 DIAGNOSIS — O92.5 SUPPRESSED LACTATION, POSTPARTUM CONDITION OR COMPLICATION: Primary | ICD-10-CM

## 2019-08-12 DIAGNOSIS — Z91.89 BREASTFEEDING PROBLEM: ICD-10-CM

## 2019-08-12 DIAGNOSIS — N64.4 NIPPLE PAIN: ICD-10-CM

## 2019-08-12 PROCEDURE — 99213 OFFICE O/P EST LOW 20 MIN: CPT

## 2019-08-12 NOTE — PATIENT INSTRUCTIONS
Breastfeeding Suggestions for Sore Nipples and/or Possible Reflux    Snuggle your baby in skin to skin contact between and during feedings whenever possible. Massage your breasts before nursing or pumping to soften areola if needed.     Breastfeed with h · Burp frequently  · Lean back during feedings. · Hold upright after nursing for 15-30 minutes. · Nurse or carry upright in baby carrier to soothe fussiness. · Discuss spitting up and fussiness with baby's doctor.      To care for nipples until healed: Massage your breasts before nursing or pumping. Practice relaxation techniques like visual imagery. Increase the frequency of feedings and/or pumping sessions.   Most babies will feed 8-12 times in 24 hours with some periods of clusterfeeding, espec milk and urine, diarrhea, increase in asthmatic symptoms  Usual dosage: 1 to 4 capsules 3 to 4 times per day. It can also be taken as one cup of strained tea three times per day    Follow-up:  \" Call lactation [phone] in 3-5 days and as needed.    \" breastmilk into the nipple shield. Latching your baby on to the breast:  • Stroke your baby’s lower lip with the nipple of the shield. Wait for your baby to open wide like a “yawn,” with the tongue down and out over the lower lip.  Bring baby’s mouth di milk supply while using the shield. If your baby is not latching on yet, pump at least 8-12 times each 24 hours. • Wash the nipple shield in hot soapy water, rinse and air dry. The shield may be boiled. Follow-up is VERY important!   • Let your baby’s week    Supplementation:   If not meeting these guidelines for adequate breastfeeding, feed 1-2 oz or more expressed milk or formula with a wide based, slow flow nipple.      Paced bottle feeding using a slow flow nipple:   · Hold your baby in an upright po website  [address]. org        The [facility] are available to continue helping you breastfeed.   To schedule an appointment at our office  Call [phone]    Suggestions to increase milk supply and relea comfortable to do so. NOTE: Initially, in the colostrum phase amounts vary from a few drops to 30cc (1oz.). • If pumping is painful, turn the pump off, reposition flanges, check that the size is correct for your nipples, and adjust the suction.  If nipple chin with the bottom of your areola  Latching on:  • Express drops of milk onto your baby’s lips to encourage licking. • Point your nipple to baby’s nose and stroke lightly down the center of lips.   • Wait for wide mouth with tongue cupped at bottom of mo breast milk volume daily (and need to pump more frequently). Call [facility] at [phone] with questions or concerns, and for follow up appointment in [address] [place] [address] at [facility].  Call Pediatrician with concerns related to baby.   Good luck!!

## 2019-08-20 ENCOUNTER — OFFICE VISIT (OUTPATIENT)
Dept: INTERNAL MEDICINE CLINIC | Facility: CLINIC | Age: 29
End: 2019-08-20
Payer: COMMERCIAL

## 2019-08-20 VITALS
BODY MASS INDEX: 32.31 KG/M2 | HEIGHT: 65.35 IN | DIASTOLIC BLOOD PRESSURE: 62 MMHG | TEMPERATURE: 98 F | RESPIRATION RATE: 16 BRPM | HEART RATE: 74 BPM | WEIGHT: 196.25 LBS | OXYGEN SATURATION: 98 % | SYSTOLIC BLOOD PRESSURE: 116 MMHG

## 2019-08-20 DIAGNOSIS — K64.9 HEMORRHOIDS, UNSPECIFIED HEMORRHOID TYPE: ICD-10-CM

## 2019-08-20 DIAGNOSIS — Z00.00 ROUTINE GENERAL MEDICAL EXAMINATION AT A HEALTH CARE FACILITY: Primary | ICD-10-CM

## 2019-08-20 DIAGNOSIS — N63.25 BREAST LUMP ON LEFT SIDE AT 9 O'CLOCK POSITION: ICD-10-CM

## 2019-08-20 PROCEDURE — 99395 PREV VISIT EST AGE 18-39: CPT | Performed by: NURSE PRACTITIONER

## 2019-08-20 PROCEDURE — 99213 OFFICE O/P EST LOW 20 MIN: CPT | Performed by: NURSE PRACTITIONER

## 2019-08-20 NOTE — PROGRESS NOTES
Patient presents with:  Physical: Pt is not fasting. Pt has gyn. Pt just had baby 2 wks ago. Pt states she is breast feeding and feels a lump on right breast (near armpit). HPI:  Pt had son 2 weeks ago, vaginal birth uncomplicated.  Has been breast fee • Other (Other) Paternal Grandfather        Social History    Socioeconomic History      Marital status:       Spouse name: Not on file      Number of children: Not on file      Years of education: Not on file      Highest education level: Not on REVIEW OF SYSTEMS:  GENERAL: Feeling generally well. SKIN: No changes, abnormal lesions or concerns. Denies excessive sun exposure. EYES: Denies diplopia or blurred vision. EARS: Denies tinnitus or decreased hearing acuity.   CARDIOVASCULAR: Denie murmurs. No edema of the bilateral lower extremities. No JVD noted. PULMONARY: CTA bilaterally, good air exchange, no rhonchi, wheezes, or rales.    BREASTS: no masses, no nipple discharge, no nodes; normal skin  Rectal: External hemorrhoids noted, no ble

## 2019-08-23 ENCOUNTER — HOSPITAL ENCOUNTER (OUTPATIENT)
Dept: MAMMOGRAPHY | Facility: HOSPITAL | Age: 29
Discharge: HOME | End: 2019-08-23
Attending: NURSE PRACTITIONER
Payer: COMMERCIAL

## 2019-08-23 DIAGNOSIS — N63.25 BREAST LUMP ON LEFT SIDE AT 9 O'CLOCK POSITION: ICD-10-CM

## 2019-08-23 PROCEDURE — 76642 ULTRASOUND BREAST LIMITED: CPT | Performed by: NURSE PRACTITIONER

## 2019-09-03 ENCOUNTER — OFFICE VISIT (OUTPATIENT)
Dept: SURGERY | Facility: CLINIC | Age: 29
End: 2019-09-03
Payer: COMMERCIAL

## 2019-09-03 ENCOUNTER — TELEPHONE (OUTPATIENT)
Dept: INTERNAL MEDICINE CLINIC | Facility: CLINIC | Age: 29
End: 2019-09-03

## 2019-09-03 VITALS
SYSTOLIC BLOOD PRESSURE: 118 MMHG | HEART RATE: 59 BPM | TEMPERATURE: 98 F | BODY MASS INDEX: 32.49 KG/M2 | WEIGHT: 195 LBS | HEIGHT: 65 IN | DIASTOLIC BLOOD PRESSURE: 76 MMHG | RESPIRATION RATE: 16 BRPM

## 2019-09-03 DIAGNOSIS — K64.5 EXTERNAL HEMORRHOID, THROMBOSED: Primary | ICD-10-CM

## 2019-09-03 DIAGNOSIS — K64.1 GRADE II INTERNAL HEMORRHOIDS: ICD-10-CM

## 2019-09-03 PROCEDURE — 99243 OFF/OP CNSLTJ NEW/EST LOW 30: CPT | Performed by: COLON & RECTAL SURGERY

## 2019-09-03 PROCEDURE — 46600 DIAGNOSTIC ANOSCOPY SPX: CPT | Performed by: COLON & RECTAL SURGERY

## 2019-09-03 NOTE — PATIENT INSTRUCTIONS
Assessment   External hemorrhoid, thrombosed  (primary encounter diagnosis)  Grade II internal hemorrhoids      Plan   Patient likely had a thrombosed external hemorrhoid. This is resolving after treatment with topical Proctosol.     I did discuss with the

## 2019-09-03 NOTE — H&P
New Patient Visit Note       Active Problems      1. External hemorrhoid, thrombosed    2.  Grade II internal hemorrhoids        Chief Complaint   Patient presents with:  Hemorrhoids: large painful hemorhoid      History of Present Illness   Anais Calero have a cystocele. The patient does not have prolapse of the vagina or cervix. The patient's family history is negative for Colorectal cancer .     The patient's past medical history is noncontributory    The patient's past surgical history is negative f Constitutional: Negative for chills, diaphoresis, fatigue and fever. HENT: Negative for rhinorrhea, sore throat and trouble swallowing. Eyes: Negative for visual disturbance. Respiratory: Negative for shortness of breath and wheezing.     Clyde Virgen perineum and perianal skin were examined. There was not perianal excoriation. In the right posterior location was enlarged external hemorrhoid tissue. This was tender to palpation. There was a small palpable residual thrombus.   There is no bleeding perform internal hemorrhoid excision if in continuity with the external hemorrhoid tissue.     The risks of hemorrhoidectomy were discussed with the patient and include but are not limited to severe or chronic pain, hemorrhage, infection or sepsis, incision

## 2019-09-03 NOTE — H&P (VIEW-ONLY)
New Patient Visit Note       Active Problems      1. External hemorrhoid, thrombosed    2.  Grade II internal hemorrhoids        Chief Complaint   Patient presents with:  Hemorrhoids: large painful hemorhoid      History of Present Illness   Alan Pierce have a cystocele. The patient does not have prolapse of the vagina or cervix. The patient's family history is negative for Colorectal cancer .     The patient's past medical history is noncontributory    The patient's past surgical history is negative f Constitutional: Negative for chills, diaphoresis, fatigue and fever. HENT: Negative for rhinorrhea, sore throat and trouble swallowing. Eyes: Negative for visual disturbance. Respiratory: Negative for shortness of breath and wheezing.     Aurora Viera perineum and perianal skin were examined. There was not perianal excoriation. In the right posterior location was enlarged external hemorrhoid tissue. This was tender to palpation. There was a small palpable residual thrombus.   There is no bleeding perform internal hemorrhoid excision if in continuity with the external hemorrhoid tissue.     The risks of hemorrhoidectomy were discussed with the patient and include but are not limited to severe or chronic pain, hemorrhage, infection or sepsis, incision

## 2019-09-03 NOTE — TELEPHONE ENCOUNTER
Patient called in, stated the hydrocortisone (PROCTOSOL HC) 2.5 % Rectal Cream has not been helping her condition and was wondering if the office does banding. Please call pt to discuss.

## 2019-09-03 NOTE — TELEPHONE ENCOUNTER
It takes time for swelling to go down after pregnancy. She should be drinking at least 8 glasses of water a day and could add in benefiber to ensure stools are soft. If she wants further assessment please send to general surgery Dr. Aysha Aguilar.

## 2019-09-04 RX ORDER — HEPARIN SODIUM 5000 [USP'U]/ML
5000 INJECTION, SOLUTION INTRAVENOUS; SUBCUTANEOUS ONCE
Status: CANCELLED | OUTPATIENT
Start: 2019-09-04 | End: 2019-09-04

## 2019-09-04 RX ORDER — CEFAZOLIN SODIUM/WATER 2 G/20 ML
2 SYRINGE (ML) INTRAVENOUS ONCE
Status: CANCELLED | OUTPATIENT
Start: 2019-09-04 | End: 2019-09-04

## 2019-09-16 ENCOUNTER — TELEPHONE (OUTPATIENT)
Dept: SURGERY | Facility: CLINIC | Age: 29
End: 2019-09-16

## 2019-09-17 PROBLEM — K64.5 HEMORRHOID THROMBOSIS: Status: ACTIVE | Noted: 2019-09-17

## 2019-09-18 ENCOUNTER — HOSPITAL ENCOUNTER (OUTPATIENT)
Facility: HOSPITAL | Age: 29
Setting detail: HOSPITAL OUTPATIENT SURGERY
Discharge: HOME | End: 2019-09-18
Attending: COLON & RECTAL SURGERY | Admitting: COLON & RECTAL SURGERY
Payer: COMMERCIAL

## 2019-09-18 VITALS
OXYGEN SATURATION: 100 % | HEART RATE: 50 BPM | TEMPERATURE: 98 F | BODY MASS INDEX: 31.59 KG/M2 | HEIGHT: 65 IN | WEIGHT: 189.63 LBS | SYSTOLIC BLOOD PRESSURE: 104 MMHG | RESPIRATION RATE: 16 BRPM | DIASTOLIC BLOOD PRESSURE: 67 MMHG

## 2019-09-18 DIAGNOSIS — K64.5 EXTERNAL HEMORRHOID, THROMBOSED: ICD-10-CM

## 2019-09-18 DIAGNOSIS — K64.1 GRADE II INTERNAL HEMORRHOIDS: ICD-10-CM

## 2019-09-18 LAB
POCT LOT NUMBER: NORMAL
POCT URINE PREGNANCY: NEGATIVE

## 2019-09-18 PROCEDURE — 46260 REMOVE IN/EX HEM GROUPS 2+: CPT | Performed by: COLON & RECTAL SURGERY

## 2019-09-18 PROCEDURE — 06BY3ZC EXCISION OF HEMORRHOIDAL PLEXUS, PERCUTANEOUS APPROACH: ICD-10-PCS | Performed by: COLON & RECTAL SURGERY

## 2019-09-18 RX ORDER — SCOLOPAMINE TRANSDERMAL SYSTEM 1 MG/1
1 PATCH, EXTENDED RELEASE TRANSDERMAL ONCE
Status: DISCONTINUED | OUTPATIENT
Start: 2019-09-18 | End: 2019-09-18

## 2019-09-18 RX ORDER — ACETAMINOPHEN 500 MG
1000 TABLET ORAL ONCE
Status: DISCONTINUED | OUTPATIENT
Start: 2019-09-18 | End: 2019-09-18 | Stop reason: HOSPADM

## 2019-09-18 RX ORDER — HYDROMORPHONE HYDROCHLORIDE 1 MG/ML
INJECTION, SOLUTION INTRAMUSCULAR; INTRAVENOUS; SUBCUTANEOUS
Status: COMPLETED
Start: 2019-09-18 | End: 2019-09-18

## 2019-09-18 RX ORDER — MIDAZOLAM HYDROCHLORIDE 1 MG/ML
1 INJECTION INTRAMUSCULAR; INTRAVENOUS EVERY 5 MIN PRN
Status: DISCONTINUED | OUTPATIENT
Start: 2019-09-18 | End: 2019-09-18

## 2019-09-18 RX ORDER — OXYCODONE HYDROCHLORIDE 5 MG/1
5 TABLET ORAL EVERY 6 HOURS PRN
Qty: 30 TABLET | Refills: 0 | Status: SHIPPED | OUTPATIENT
Start: 2019-09-18 | End: 2019-10-03

## 2019-09-18 RX ORDER — HYDROCODONE BITARTRATE AND ACETAMINOPHEN 5; 325 MG/1; MG/1
1 TABLET ORAL AS NEEDED
Status: DISCONTINUED | OUTPATIENT
Start: 2019-09-18 | End: 2019-09-18

## 2019-09-18 RX ORDER — SODIUM CHLORIDE, SODIUM LACTATE, POTASSIUM CHLORIDE, CALCIUM CHLORIDE 600; 310; 30; 20 MG/100ML; MG/100ML; MG/100ML; MG/100ML
INJECTION, SOLUTION INTRAVENOUS CONTINUOUS
Status: DISCONTINUED | OUTPATIENT
Start: 2019-09-18 | End: 2019-09-18

## 2019-09-18 RX ORDER — ACETAMINOPHEN 500 MG
1000 TABLET ORAL ONCE AS NEEDED
Status: DISCONTINUED | OUTPATIENT
Start: 2019-09-18 | End: 2019-09-18

## 2019-09-18 RX ORDER — HYDROCODONE BITARTRATE AND ACETAMINOPHEN 5; 325 MG/1; MG/1
2 TABLET ORAL AS NEEDED
Status: DISCONTINUED | OUTPATIENT
Start: 2019-09-18 | End: 2019-09-18

## 2019-09-18 RX ORDER — HYDROMORPHONE HYDROCHLORIDE 1 MG/ML
0.4 INJECTION, SOLUTION INTRAMUSCULAR; INTRAVENOUS; SUBCUTANEOUS EVERY 5 MIN PRN
Status: DISCONTINUED | OUTPATIENT
Start: 2019-09-18 | End: 2019-09-18

## 2019-09-18 RX ORDER — BUPIVACAINE HYDROCHLORIDE AND EPINEPHRINE 2.5; 5 MG/ML; UG/ML
INJECTION, SOLUTION INFILTRATION; PERINEURAL AS NEEDED
Status: DISCONTINUED | OUTPATIENT
Start: 2019-09-18 | End: 2019-09-18 | Stop reason: HOSPADM

## 2019-09-18 RX ORDER — MEPERIDINE HYDROCHLORIDE 25 MG/ML
12.5 INJECTION INTRAMUSCULAR; INTRAVENOUS; SUBCUTANEOUS AS NEEDED
Status: DISCONTINUED | OUTPATIENT
Start: 2019-09-18 | End: 2019-09-18

## 2019-09-18 RX ORDER — NALOXONE HYDROCHLORIDE 0.4 MG/ML
80 INJECTION, SOLUTION INTRAMUSCULAR; INTRAVENOUS; SUBCUTANEOUS AS NEEDED
Status: DISCONTINUED | OUTPATIENT
Start: 2019-09-18 | End: 2019-09-18

## 2019-09-18 RX ORDER — ONDANSETRON 2 MG/ML
INJECTION INTRAMUSCULAR; INTRAVENOUS
Status: COMPLETED
Start: 2019-09-18 | End: 2019-09-18

## 2019-09-18 RX ORDER — ONDANSETRON 2 MG/ML
4 INJECTION INTRAMUSCULAR; INTRAVENOUS AS NEEDED
Status: DISCONTINUED | OUTPATIENT
Start: 2019-09-18 | End: 2019-09-18

## 2019-09-18 NOTE — BRIEF OP NOTE
Pre-Operative Diagnosis: External hemorrhoid, thrombosed [K64.5]  Grade II internal hemorrhoids [K64.1]     Post-Operative Diagnosis: External hemorrhoid, thrombosed [O52. 5]Grade II internal hemorrhoids [K64.1]      Procedure Performed:   Procedure(s):  AN

## 2019-09-18 NOTE — OPERATIVE REPORT
[facility]  Operative Report    Kathy Brito Location: OR   CSN [id number] MRN [id number]   Admission Date 9/18/2019 Operation Date 9/18/2019   Attending Physician Norbert Homans, MD Operating Physician Roxanne Avendaño MD       Pa jackknife position, the buttocks were taped apart, and the perineum was painted with Betadine paint. Sterile drapes were placed with wide exposure of the perineum. Anoscopy was performed to reveal the above listed findings.  Each hemorrhoid was treated i

## 2019-09-18 NOTE — INTERVAL H&P NOTE
Pre-op Diagnosis: External hemorrhoid, thrombosed [K64.5]  Grade II internal hemorrhoids [K64.1]    The above referenced H&P was reviewed by Antonino Prakash MD on 9/18/2019, the patient was examined and no significant changes have occurred in the pat

## 2019-09-25 ENCOUNTER — TELEPHONE (OUTPATIENT)
Dept: SURGERY | Facility: CLINIC | Age: 29
End: 2019-09-25

## 2019-09-25 ENCOUNTER — OFFICE VISIT (OUTPATIENT)
Dept: SURGERY | Facility: CLINIC | Age: 29
End: 2019-09-25

## 2019-09-25 VITALS
HEART RATE: 72 BPM | DIASTOLIC BLOOD PRESSURE: 75 MMHG | BODY MASS INDEX: 31.34 KG/M2 | SYSTOLIC BLOOD PRESSURE: 117 MMHG | WEIGHT: 195 LBS | TEMPERATURE: 99 F | HEIGHT: 66 IN

## 2019-09-25 DIAGNOSIS — K64.5 EXTERNAL HEMORRHOID, THROMBOSED: Primary | ICD-10-CM

## 2019-09-25 PROCEDURE — 99024 POSTOP FOLLOW-UP VISIT: CPT | Performed by: SURGERY

## 2019-09-25 NOTE — TELEPHONE ENCOUNTER
Patient called office states Monday night had put pressure on rectum and now feels new lumps and skin \"pieces\" hanging from rectum. Patient had ANAL EXAMINATION UNDER ANESTHESIA, EXCISION OFINTERNAL AND EXTERNAL HEMORRHOIDS X2 on 9/18 by Dr. Eveline Campos.   Sta

## 2019-09-25 NOTE — PROGRESS NOTES
[facility]  Progress Note    Hernandez Ruiz Patient Status:  No patient class for patient encounter    5/3/1990 MRN [id number]   Location [address] [place] [address], [address] [place] [address] Attending No att. providers found   Hosp Day # 0 PCP Og condition or complication     External hemorrhoid, thrombosed     Grade III internal hemorrhoids      Impression: Status post hemorrhoidectomy with some normal healing with inflammatory tissue at the excision sites.   No intervention required patient will l

## 2019-09-27 ENCOUNTER — OFFICE VISIT (OUTPATIENT)
Dept: INTERNAL MEDICINE CLINIC | Facility: CLINIC | Age: 29
End: 2019-09-27
Payer: COMMERCIAL

## 2019-09-27 VITALS
OXYGEN SATURATION: 99 % | RESPIRATION RATE: 14 BRPM | DIASTOLIC BLOOD PRESSURE: 72 MMHG | SYSTOLIC BLOOD PRESSURE: 114 MMHG | HEIGHT: 66 IN | WEIGHT: 196 LBS | HEART RATE: 78 BPM | BODY MASS INDEX: 31.5 KG/M2 | TEMPERATURE: 98 F

## 2019-09-27 DIAGNOSIS — M72.2 PLANTAR FASCIITIS: Primary | ICD-10-CM

## 2019-09-27 DIAGNOSIS — H83.2X9 VESTIBULAR DYSFUNCTION, UNSPECIFIED LATERALITY: ICD-10-CM

## 2019-09-27 PROCEDURE — 99214 OFFICE O/P EST MOD 30 MIN: CPT | Performed by: FAMILY MEDICINE

## 2019-09-27 RX ORDER — PREDNISONE 10 MG/1
TABLET ORAL
Qty: 20 TABLET | Refills: 0 | Status: SHIPPED | OUTPATIENT
Start: 2019-09-27 | End: 2019-11-04

## 2019-09-27 NOTE — PROGRESS NOTES
HPI:    Patient ID: Bonita Dinero is a 30year old female.     HPI  On Labor day went walking on trails that was gravel     Wore gym shoes    Usually does not do this Next day woke up w R heel pain   Hurts to walk on it   No injury    On  Tylenol a unspecified laterality    No orders of the defined types were placed in this encounter.       Meds This Visit:  Requested Prescriptions     Signed Prescriptions Disp Refills   • predniSONE 10 MG Oral Tab 20 tablet 0     Si PO for 2 days,  3 PO for 2 day

## 2019-10-03 ENCOUNTER — OFFICE VISIT (OUTPATIENT)
Dept: SURGERY | Facility: CLINIC | Age: 29
End: 2019-10-03

## 2019-10-03 VITALS
BODY MASS INDEX: 31.65 KG/M2 | SYSTOLIC BLOOD PRESSURE: 110 MMHG | TEMPERATURE: 99 F | HEIGHT: 65 IN | WEIGHT: 190 LBS | HEART RATE: 65 BPM | DIASTOLIC BLOOD PRESSURE: 74 MMHG

## 2019-10-03 DIAGNOSIS — Z09 FOLLOW-UP EXAMINATION: Primary | ICD-10-CM

## 2019-10-03 DIAGNOSIS — K64.5 EXTERNAL HEMORRHOID, THROMBOSED: ICD-10-CM

## 2019-10-03 DIAGNOSIS — K64.1 GRADE II INTERNAL HEMORRHOIDS: ICD-10-CM

## 2019-10-03 PROCEDURE — 99024 POSTOP FOLLOW-UP VISIT: CPT | Performed by: COLON & RECTAL SURGERY

## 2019-10-03 NOTE — PROGRESS NOTES
Office Visit Note       Active Problems  1. Follow-up examination    2. External hemorrhoid, thrombosed    3.  Grade II internal hemorrhoids         Chief Complaint   Patient presents with:  Post-Op: pain and bleeding resolved         History of Present Ill • Cancer Maternal Grandmother 58        breast cancer   • Breast Cancer Mother 54        s/p R mastectomy    • Other (Other) Paternal Grandfather      Social History    Tobacco Use      Smoking status: Never Smoker      Smokeless tobacco: Never Used    A Constitutional: She is oriented to person, place, and time. She appears well-developed and well-nourished. No distress. HENT:   Head: Normocephalic and atraumatic. Nose: Nose normal.   Eyes: Pupils are equal, round, and reactive to light.  EOM are nor Holly Kemp MD on 9/19/2019 at  2:16 PM              Assessment   Follow-up examination  (primary encounter diagnosis)  External hemorrhoid, thrombosed  Grade II internal hemorrhoids      Plan   The patient is doing well after 2 column hemorrhoidectomy.

## 2019-10-03 NOTE — PATIENT INSTRUCTIONS
Assessment   Follow-up examination  (primary encounter diagnosis)  External hemorrhoid, thrombosed  Grade II internal hemorrhoids      Plan   The patient is doing well after 2 column hemorrhoidectomy. Patient is currently 2 weeks postop.     The hemorrhoid

## 2019-11-04 ENCOUNTER — OFFICE VISIT (OUTPATIENT)
Dept: SURGERY | Facility: CLINIC | Age: 29
End: 2019-11-04

## 2019-11-04 VITALS
DIASTOLIC BLOOD PRESSURE: 80 MMHG | TEMPERATURE: 98 F | BODY MASS INDEX: 32.49 KG/M2 | HEIGHT: 65 IN | SYSTOLIC BLOOD PRESSURE: 117 MMHG | WEIGHT: 195 LBS | HEART RATE: 73 BPM

## 2019-11-04 DIAGNOSIS — K64.1 GRADE II INTERNAL HEMORRHOIDS: ICD-10-CM

## 2019-11-04 DIAGNOSIS — Z09 FOLLOW-UP EXAMINATION: Primary | ICD-10-CM

## 2019-11-04 PROCEDURE — 99024 POSTOP FOLLOW-UP VISIT: CPT | Performed by: COLON & RECTAL SURGERY

## 2019-11-04 NOTE — PROGRESS NOTES
Office Visit Note       Active Problems  1. Follow-up examination    2.  Grade II internal hemorrhoids         Chief Complaint   Patient presents with:  Post-Op: No current complaints         History of Present Illness   Barbarajosias Barbour is a 29 year daily.  prenatal multivitamin plus DHA 27-0.8-228 MG Oral Cap, Take 1 capsule by mouth daily. No current facility-administered medications on file prior to visit. Review of Systems  The Review of Systems has been reviewed by me during today.   Re cervical adenopathy. Neurological: She is alert and oriented to person, place, and time. Skin: Skin is warm and dry. No rash noted. She is not diaphoretic. Psychiatric: She has a normal mood and affect.  Her behavior is normal.        The perineum and

## 2019-11-05 NOTE — PATIENT INSTRUCTIONS
Assessment   Follow-up examination  (primary encounter diagnosis)  Grade II internal hemorrhoids      Plan   The patient is doing well 7 weeks status post excisional hemorrhoid ectomy. Patient appears to be well-healed. She has no anorectal symptoms.

## 2020-06-02 PROBLEM — Z34.80 ENCOUNTER FOR SUPERVISION OF OTHER NORMAL PREGNANCY, UNSPECIFIED TRIMESTER: Status: ACTIVE | Noted: 2020-06-02

## 2020-06-02 PROBLEM — O09.299 H/O MACROSOMIA IN INFANT IN PRIOR PREGNANCY, CURRENTLY PREGNANT (HCC): Status: RESOLVED | Noted: 2019-07-11 | Resolved: 2020-06-02

## 2020-06-02 PROBLEM — Z34.90 PREGNANCY: Status: RESOLVED | Noted: 2019-05-04 | Resolved: 2020-06-02

## 2020-06-02 PROBLEM — O09.30 LATE PRENATAL CARE: Status: ACTIVE | Noted: 2020-06-02

## 2020-06-02 PROBLEM — Z34.90 PREGNANCY (HCC): Status: RESOLVED | Noted: 2019-05-04 | Resolved: 2020-06-02

## 2020-06-02 PROBLEM — B95.1 GROUP BETA STREP POSITIVE: Status: RESOLVED | Noted: 2019-07-10 | Resolved: 2020-06-02

## 2020-06-02 PROBLEM — Z14.8 GENETIC CARRIER OF OTHER DISEASE: Status: RESOLVED | Noted: 2019-02-13 | Resolved: 2020-06-02

## 2020-06-02 PROBLEM — O41.8X10 SUBCHORIONIC HEMORRHAGE OF PLACENTA IN FIRST TRIMESTER: Status: RESOLVED | Noted: 2019-03-28 | Resolved: 2020-06-02

## 2020-06-02 PROBLEM — O20.8 SUBCHORIONIC HEMORRHAGE OF PLACENTA IN FIRST TRIMESTER (HCC): Status: RESOLVED | Noted: 2019-03-28 | Resolved: 2020-06-02

## 2020-06-02 PROBLEM — O09.30 LATE PRENATAL CARE (HCC): Status: ACTIVE | Noted: 2020-06-02

## 2020-06-02 PROBLEM — O09.299 H/O MACROSOMIA IN INFANT IN PRIOR PREGNANCY, CURRENTLY PREGNANT: Status: RESOLVED | Noted: 2019-07-11 | Resolved: 2020-06-02

## 2020-06-02 PROBLEM — O09.299 HISTORY OF MACROSOMIA IN INFANT IN PRIOR PREGNANCY, CURRENTLY PREGNANT (HCC): Status: ACTIVE | Noted: 2020-06-02

## 2020-06-02 PROBLEM — O09.299 HISTORY OF MACROSOMIA IN INFANT IN PRIOR PREGNANCY, CURRENTLY PREGNANT: Status: ACTIVE | Noted: 2020-06-02

## 2020-06-02 PROBLEM — O46.8X1 SUBCHORIONIC HEMORRHAGE OF PLACENTA IN FIRST TRIMESTER: Status: RESOLVED | Noted: 2019-03-28 | Resolved: 2020-06-02

## 2020-06-02 PROBLEM — Z34.80 ENCOUNTER FOR SUPERVISION OF OTHER NORMAL PREGNANCY, UNSPECIFIED TRIMESTER (HCC): Status: ACTIVE | Noted: 2020-06-02

## 2020-06-02 PROBLEM — O20.8 SUBCHORIONIC HEMORRHAGE OF PLACENTA IN FIRST TRIMESTER: Status: RESOLVED | Noted: 2019-03-28 | Resolved: 2020-06-02

## 2020-06-22 PROBLEM — R82.71 GBS BACTERIURIA: Status: ACTIVE | Noted: 2020-06-22

## 2020-08-06 ENCOUNTER — TELEPHONE (OUTPATIENT)
Dept: INTERNAL MEDICINE CLINIC | Facility: CLINIC | Age: 30
End: 2020-08-06

## 2020-08-06 ENCOUNTER — TELEMEDICINE (OUTPATIENT)
Dept: INTERNAL MEDICINE CLINIC | Facility: CLINIC | Age: 30
End: 2020-08-06
Payer: MEDICAID

## 2020-08-06 DIAGNOSIS — J02.9 PHARYNGITIS, UNSPECIFIED ETIOLOGY: Primary | ICD-10-CM

## 2020-08-06 DIAGNOSIS — Z20.822 SUSPECTED COVID-19 VIRUS INFECTION: ICD-10-CM

## 2020-08-06 DIAGNOSIS — R09.81 NASAL SINUS CONGESTION: ICD-10-CM

## 2020-08-06 PROCEDURE — 99213 OFFICE O/P EST LOW 20 MIN: CPT | Performed by: FAMILY MEDICINE

## 2020-08-06 RX ORDER — AMOXICILLIN 875 MG/1
875 TABLET, COATED ORAL 2 TIMES DAILY
Qty: 20 TABLET | Refills: 0 | Status: SHIPPED | OUTPATIENT
Start: 2020-08-06 | End: 2020-08-27

## 2020-08-06 NOTE — PROGRESS NOTES
Virtual Telephone Check-In    Hernandez Ruiz verbally consents to a Virtual/ video Telephone Check-In visit on 08/06/20. Patient has been referred to the [facility] website at [email or web address] to review the yearly Consent to Treat document.     P lightheadedness. PE:  Patient not seen in the office,appoint via telephone so no vital signs were obtained. Pt is A&Ox3, speaking in complete sentences with no audible respiratory distress or cough. Patient does sound hoarse when she talks.   [employer] relationship, due to the ongoing public health crisis/national emergency and because of restrictions of visitation. There are limitations of this visit as no physical exam could be performed.   Every conscious effort was taken to allow for sufficient and a

## 2020-08-06 NOTE — TELEPHONE ENCOUNTER
Patient not wanting to wait until tomorrow.  Ok to schedule for today per [facility]    Future Appointments   Date Time Provider Lizandro Lind   8/6/2020  3:00 PM CORWIN Parr [facility] [place]

## 2020-08-06 NOTE — TELEPHONE ENCOUNTER
Patient calling in with symptoms of sore throat and a stuffy nose, with a little body aches. Pt is also 30-weeks pregnant. Pt denies all other symptoms, and also denies exposure to a positive covid case.     Please call pt to discuss appointment options

## 2020-08-07 ENCOUNTER — LAB ENCOUNTER (OUTPATIENT)
Dept: LAB | Facility: HOSPITAL | Age: 30
End: 2020-08-07
Attending: FAMILY MEDICINE
Payer: COMMERCIAL

## 2020-08-07 DIAGNOSIS — Z20.822 SUSPECTED COVID-19 VIRUS INFECTION: ICD-10-CM

## 2020-08-08 LAB — SARS-COV-2 RNA RESP QL NAA+PROBE: NOT DETECTED

## 2020-09-10 PROBLEM — O40.9XX0 AFI (AMNIOTIC FLUID INDEX) INCREASED (HCC): Status: ACTIVE | Noted: 2020-09-10

## 2020-09-10 PROBLEM — O40.9XX0 AFI (AMNIOTIC FLUID INDEX) INCREASED: Status: ACTIVE | Noted: 2020-09-10

## 2020-09-29 ENCOUNTER — TELEPHONE (OUTPATIENT)
Dept: PERINATAL CARE | Facility: HOSPITAL | Age: 30
End: 2020-09-29

## 2020-10-02 PROBLEM — O40.3XX0 POLYHYDRAMNIOS IN THIRD TRIMESTER (HCC): Status: ACTIVE | Noted: 2020-09-10

## 2020-10-02 PROBLEM — O40.3XX0 POLYHYDRAMNIOS IN THIRD TRIMESTER: Status: ACTIVE | Noted: 2020-09-10

## 2020-10-12 PROBLEM — O99.210 OBESITY AFFECTING PREGNANCY, ANTEPARTUM: Status: ACTIVE | Noted: 2020-10-12

## 2020-10-12 PROBLEM — O99.210 OBESITY AFFECTING PREGNANCY, ANTEPARTUM (HCC): Status: ACTIVE | Noted: 2020-10-12

## 2020-10-28 DIAGNOSIS — Z34.90 PREGNANCY: Primary | ICD-10-CM

## 2020-11-03 ENCOUNTER — APPOINTMENT (OUTPATIENT)
Dept: LAB | Age: 30
End: 2020-11-03
Attending: OBSTETRICS & GYNECOLOGY
Payer: COMMERCIAL

## 2020-11-03 DIAGNOSIS — Z34.90 PREGNANCY: ICD-10-CM

## 2020-11-04 ENCOUNTER — TELEPHONE (OUTPATIENT)
Dept: OBGYN UNIT | Facility: HOSPITAL | Age: 30
End: 2020-11-04

## 2020-11-04 RX ORDER — MELATONIN
325
Status: ON HOLD | COMMUNITY
End: 2020-11-07

## 2020-11-06 ENCOUNTER — HOSPITAL ENCOUNTER (INPATIENT)
Facility: HOSPITAL | Age: 30
LOS: 1 days | Discharge: HOME | End: 2020-11-07
Attending: OBSTETRICS & GYNECOLOGY | Admitting: OBSTETRICS & GYNECOLOGY
Payer: COMMERCIAL

## 2020-11-06 ENCOUNTER — ANESTHESIA (OUTPATIENT)
Dept: OBGYN UNIT | Facility: HOSPITAL | Age: 30
End: 2020-11-06
Payer: COMMERCIAL

## 2020-11-06 ENCOUNTER — ANESTHESIA EVENT (OUTPATIENT)
Dept: OBGYN UNIT | Facility: HOSPITAL | Age: 30
End: 2020-11-06
Payer: COMMERCIAL

## 2020-11-06 ENCOUNTER — APPOINTMENT (OUTPATIENT)
Dept: OBGYN CLINIC | Facility: HOSPITAL | Age: 30
End: 2020-11-06
Payer: COMMERCIAL

## 2020-11-06 PROBLEM — Z34.90 NORMAL PREGNANCY (HCC): Status: ACTIVE | Noted: 2020-11-06

## 2020-11-06 PROBLEM — Z34.90 NORMAL PREGNANCY: Status: ACTIVE | Noted: 2020-11-06

## 2020-11-06 PROCEDURE — 3E033VJ INTRODUCTION OF OTHER HORMONE INTO PERIPHERAL VEIN, PERCUTANEOUS APPROACH: ICD-10-PCS | Performed by: OBSTETRICS & GYNECOLOGY

## 2020-11-06 PROCEDURE — 86901 BLOOD TYPING SEROLOGIC RH(D): CPT | Performed by: OBSTETRICS & GYNECOLOGY

## 2020-11-06 PROCEDURE — 85025 COMPLETE CBC W/AUTO DIFF WBC: CPT | Performed by: OBSTETRICS & GYNECOLOGY

## 2020-11-06 PROCEDURE — 86900 BLOOD TYPING SEROLOGIC ABO: CPT | Performed by: OBSTETRICS & GYNECOLOGY

## 2020-11-06 PROCEDURE — 86850 RBC ANTIBODY SCREEN: CPT | Performed by: OBSTETRICS & GYNECOLOGY

## 2020-11-06 PROCEDURE — 10907ZC DRAINAGE OF AMNIOTIC FLUID, THERAPEUTIC FROM PRODUCTS OF CONCEPTION, VIA NATURAL OR ARTIFICIAL OPENING: ICD-10-PCS | Performed by: OBSTETRICS & GYNECOLOGY

## 2020-11-06 PROCEDURE — 86780 TREPONEMA PALLIDUM: CPT | Performed by: OBSTETRICS & GYNECOLOGY

## 2020-11-06 RX ORDER — DIPHENHYDRAMINE HYDROCHLORIDE 50 MG/ML
12.5 INJECTION INTRAMUSCULAR; INTRAVENOUS EVERY 4 HOURS PRN
Status: DISCONTINUED | OUTPATIENT
Start: 2020-11-06 | End: 2020-11-06

## 2020-11-06 RX ORDER — DOCUSATE SODIUM 100 MG/1
100 CAPSULE, LIQUID FILLED ORAL
Status: DISCONTINUED | OUTPATIENT
Start: 2020-11-06 | End: 2020-11-07

## 2020-11-06 RX ORDER — ONDANSETRON 2 MG/ML
4 INJECTION INTRAMUSCULAR; INTRAVENOUS EVERY 6 HOURS PRN
Status: DISCONTINUED | OUTPATIENT
Start: 2020-11-06 | End: 2020-11-06 | Stop reason: HOSPADM

## 2020-11-06 RX ORDER — IBUPROFEN 600 MG/1
600 TABLET ORAL EVERY 6 HOURS PRN
Status: DISCONTINUED | OUTPATIENT
Start: 2020-11-06 | End: 2020-11-06

## 2020-11-06 RX ORDER — SIMETHICONE 80 MG
80 TABLET,CHEWABLE ORAL 3 TIMES DAILY PRN
Status: DISCONTINUED | OUTPATIENT
Start: 2020-11-06 | End: 2020-11-07

## 2020-11-06 RX ORDER — IBUPROFEN 600 MG/1
600 TABLET ORAL EVERY 6 HOURS
Status: DISCONTINUED | OUTPATIENT
Start: 2020-11-06 | End: 2020-11-07

## 2020-11-06 RX ORDER — TRISODIUM CITRATE DIHYDRATE AND CITRIC ACID MONOHYDRATE 500; 334 MG/5ML; MG/5ML
30 SOLUTION ORAL AS NEEDED
Status: DISCONTINUED | OUTPATIENT
Start: 2020-11-06 | End: 2020-11-06 | Stop reason: HOSPADM

## 2020-11-06 RX ORDER — SODIUM CHLORIDE, SODIUM LACTATE, POTASSIUM CHLORIDE, CALCIUM CHLORIDE 600; 310; 30; 20 MG/100ML; MG/100ML; MG/100ML; MG/100ML
INJECTION, SOLUTION INTRAVENOUS CONTINUOUS
Status: DISCONTINUED | OUTPATIENT
Start: 2020-11-06 | End: 2020-11-06 | Stop reason: HOSPADM

## 2020-11-06 RX ORDER — TERBUTALINE SULFATE 1 MG/ML
0.25 INJECTION, SOLUTION SUBCUTANEOUS AS NEEDED
Status: DISCONTINUED | OUTPATIENT
Start: 2020-11-06 | End: 2020-11-06 | Stop reason: HOSPADM

## 2020-11-06 RX ORDER — DEXTROSE, SODIUM CHLORIDE, SODIUM LACTATE, POTASSIUM CHLORIDE, AND CALCIUM CHLORIDE 5; .6; .31; .03; .02 G/100ML; G/100ML; G/100ML; G/100ML; G/100ML
INJECTION, SOLUTION INTRAVENOUS AS NEEDED
Status: DISCONTINUED | OUTPATIENT
Start: 2020-11-06 | End: 2020-11-06 | Stop reason: HOSPADM

## 2020-11-06 RX ORDER — ACETAMINOPHEN 325 MG/1
650 TABLET ORAL EVERY 6 HOURS PRN
Status: DISCONTINUED | OUTPATIENT
Start: 2020-11-06 | End: 2020-11-07

## 2020-11-06 RX ORDER — BISACODYL 10 MG
10 SUPPOSITORY, RECTAL RECTAL ONCE AS NEEDED
Status: DISCONTINUED | OUTPATIENT
Start: 2020-11-06 | End: 2020-11-07

## 2020-11-06 RX ORDER — BUPIVACAINE HCL/0.9 % NACL/PF 0.25 %
5 PLASTIC BAG, INJECTION (ML) EPIDURAL AS NEEDED
Status: DISCONTINUED | OUTPATIENT
Start: 2020-11-06 | End: 2020-11-06

## 2020-11-06 RX ORDER — AMMONIA INHALANTS 0.04 G/.3ML
0.3 INHALANT RESPIRATORY (INHALATION) AS NEEDED
Status: DISCONTINUED | OUTPATIENT
Start: 2020-11-06 | End: 2020-11-06 | Stop reason: HOSPADM

## 2020-11-06 RX ORDER — ACETAMINOPHEN 500 MG
500 TABLET ORAL EVERY 6 HOURS PRN
Status: DISCONTINUED | OUTPATIENT
Start: 2020-11-06 | End: 2020-11-06

## 2020-11-06 NOTE — PAYOR COMM NOTE
--------------  ADMISSION REVIEW     Payor: Jed Morel #:  [id number]  Authorization Number: [id number]    Admit date: 11/6/20  Admit time: 3623       Admitting Physician: Mayte Robbins MD  Attending Physician: fentaNYL 2mcg/ml & ropivacaine 0.15% epidural infusion     Date Action Dose Route User    11/6/2020 1357 New Bag 100 mL Epidural Luigi Root, RN      fentaNYL citrate (SUBLIMAZE) 0.05 MG/ML injection 100 mcg     Date Action Dose Route User    11/6/2020

## 2020-11-06 NOTE — PROGRESS NOTES
Labor Progress Note    No complaints.   Temp:  [98.2 °F (36.8 °C)-98.9 °F (37.2 °C)] 98.2 °F (36.8 °C)  Pulse:  [72-88] 75  Resp:  [18] 18  BP: ()/(48-73) 115/64  FHT:  baseline 140s, moderate variability, accels appreciated, variable decels noted  To

## 2020-11-06 NOTE — PLAN OF CARE
Problem: Patient/Family Goals  Goal: Patient/Family Long Term Goal  Description: Patient's Long Term Goal: Uncomplicated vaginal delivery     Interventions:Adequate pain control   -   - See additional Care Plan goals for specific interventions  11/6/2020

## 2020-11-06 NOTE — ANESTHESIA PREPROCEDURE EVALUATION
PRE-OP EVALUATION    Patient Name: Pratima Correa    Pre-op Diagnosis: * No pre-op diagnosis entered *    * No procedures listed *    * No surgeons found in log *    Pre-op vitals reviewed.   Temp: 98.2 °F (36.8 °C)  Pulse: 80  Resp: 18  BP: 119/55 Intravenous, Q4H PRN        Outpatient Medications:  [unfilled]    Allergies: Seasonal      Anesthesia Evaluation    Patient summary reviewed. Anesthetic Complications           GI/Hepatic/Renal    Negative GI/hepatic/renal ROS.

## 2020-11-06 NOTE — ANESTHESIA PROCEDURE NOTES
Labor Analgesia  Performed by: Srinivasan Gray MD  Authorized by: Srinivasan Gray MD       General Information and Staff    Start Time:  11/6/2020 2:28 PM  End Time:  11/6/2020 2:34 PM  Anesthesiologist:  Srinivasan Gray MD  Performed by:   Anesthesiologist

## 2020-11-06 NOTE — PROGRESS NOTES
Pt is a 35year old female admitted to -A. Patient presents with:  Scheduled Induction: Hx of Macrosomia     Pt is  40w0d intra-uterine pregnancy. History obtained, consents signed. Oriented to room, staff, and plan of care.

## 2020-11-06 NOTE — H&P
[address] and Delivery Prenatal History and Physical Interval Addendum  Please see full Prenatal Record for this pregnancy      SUBJECTIVE:    Interval History: This is a 35yo [id number] at 40w0d admitted for elective IOL.       OBJECTIVE:

## 2020-11-06 NOTE — L&D DELIVERY NOTE
Dalphine Cockayne, Girl [[id number]]    Labor Events     labor?: No   steroids?: None  Antibiotics received during labor?: Yes  Antibiotics (enter # doses in comment): ampicillin  Rupture date/time: 2020 1615     Rupture type: AROM  Fluid col Muscle tone Limp Some flexion Active motion    Respiratory effort Absent Weak cry; hypoventilation Good, crying              1 Minute:  5 Minute:  10 Minute:  15 Minute:  20 Minute:    Skin color: 0  1       Heart rate: 2  2       Reflex irritablity: 2

## 2020-11-06 NOTE — PLAN OF CARE
Problem: Patient/Family Goals  Goal: Patient/Family Long Term Goal  Description: Patient's Long Term Goal: Uncomplicated vaginal delivery     Interventions:Adequate pain control   -   - See additional Care Plan goals for specific interventions  Outcome:

## 2020-11-07 VITALS
OXYGEN SATURATION: 99 % | HEIGHT: 65 IN | RESPIRATION RATE: 16 BRPM | SYSTOLIC BLOOD PRESSURE: 121 MMHG | TEMPERATURE: 98 F | BODY MASS INDEX: 35.82 KG/M2 | DIASTOLIC BLOOD PRESSURE: 73 MMHG | HEART RATE: 93 BPM | WEIGHT: 215 LBS

## 2020-11-07 PROCEDURE — 85025 COMPLETE CBC W/AUTO DIFF WBC: CPT | Performed by: OBSTETRICS & GYNECOLOGY

## 2020-11-07 NOTE — PLAN OF CARE
Problem: SAFETY ADULT - FALL  Goal: Free from fall injury  Description: INTERVENTIONS:  - Assess pt frequently for physical needs  - Identify cognitive and physical deficits and behaviors that affect risk of falls.   - [place] fall precautions as indica previous experience with breast feeding.  - Provide information as needed about early infant feeding cues (e.g., rooting, lip smacking, sucking fingers/hand) versus late cue of crying.  - Discuss/demonstrate breast feeding aids (e.g., infant sling, nursing delivery     Interventions:Adequate pain control   -   - See additional Care Plan goals for specific interventions  Outcome: Completed  Goal: Patient/Family Short Term Goal  Description: Patient's Short Term Goal: Adequate pain control     Interventions:

## 2020-11-07 NOTE — PROGRESS NOTES
Patient up to bathroom with assist x 2. Voided 400 at this time. Patient transferred to mother/baby room 2207 per wheelchair in stable condition with baby and personal belongings. Accompanied by significant other and staff.   Report given to mother/baby R

## 2020-11-07 NOTE — PROGRESS NOTES
Labor Analgesia Follow Up Note    Patient underwent epidural anesthesia for labor analgesia,    Placenta Date/Time: 11/6/2020  5:08 PM    Delivery Date/Time[de-identified] 11/6/2020  5:05 PM    /73 (BP Location: Right arm)   Pulse 93   Temp 98.3 °F (36.8 °C) (Ora

## 2020-11-09 ENCOUNTER — TELEPHONE (OUTPATIENT)
Dept: CASE MANAGEMENT | Facility: HOSPITAL | Age: 30
End: 2020-11-09

## 2020-11-09 ENCOUNTER — TELEPHONE (OUTPATIENT)
Dept: OBGYN UNIT | Facility: HOSPITAL | Age: 30
End: 2020-11-09

## 2020-11-09 NOTE — PROGRESS NOTES
Reviewed self and infant care w / mom, she verbalizes understanding of instructions reviewed. Encourage to follow up w/ MDs as directed and w/ questions/concerns. Bottlefding now, enc q3-4 at noc until rereaches birthweight.  OB appt later today to check pe

## 2020-11-09 NOTE — PAYOR COMM NOTE
--------------  DISCHARGE REVIEW    Payor: Jed Morel #:  [id number]  Authorization Number: N/A    Admit date: 11/6/20  Admit time:  1502  Discharge Date: 11/7/2020  7:15 PM     Admitting Physician: Jairon Pastor

## 2020-11-09 NOTE — PROGRESS NOTES
Verner Stabler from [address] [place] [address] left message on CM phone on 11/6/20 at 16:29, stating that they needed clinical faxed to them on either 11/7 or 11/8, because 11/9 would be late. CM did not get message until 11/9. CM checked and [facility] had faxed clinical on 11/9/20.

## 2020-11-12 ENCOUNTER — TELEMEDICINE (OUTPATIENT)
Dept: INTERNAL MEDICINE CLINIC | Facility: CLINIC | Age: 30
End: 2020-11-12
Payer: MEDICAID

## 2020-11-12 DIAGNOSIS — R51.9 ACUTE NONINTRACTABLE HEADACHE, UNSPECIFIED HEADACHE TYPE: Primary | ICD-10-CM

## 2020-11-12 PROBLEM — O99.210 OBESITY AFFECTING PREGNANCY, ANTEPARTUM (HCC): Status: RESOLVED | Noted: 2020-10-12 | Resolved: 2020-11-12

## 2020-11-12 PROBLEM — Z34.80 ENCOUNTER FOR SUPERVISION OF OTHER NORMAL PREGNANCY, UNSPECIFIED TRIMESTER (HCC): Status: RESOLVED | Noted: 2020-06-02 | Resolved: 2020-11-12

## 2020-11-12 PROBLEM — O09.299 HISTORY OF MACROSOMIA IN INFANT IN PRIOR PREGNANCY, CURRENTLY PREGNANT (HCC): Status: RESOLVED | Noted: 2020-06-02 | Resolved: 2020-11-12

## 2020-11-12 PROBLEM — Z34.80 ENCOUNTER FOR SUPERVISION OF OTHER NORMAL PREGNANCY, UNSPECIFIED TRIMESTER: Status: RESOLVED | Noted: 2020-06-02 | Resolved: 2020-11-12

## 2020-11-12 PROBLEM — O09.299 HISTORY OF MACROSOMIA IN INFANT IN PRIOR PREGNANCY, CURRENTLY PREGNANT: Status: RESOLVED | Noted: 2020-06-02 | Resolved: 2020-11-12

## 2020-11-12 PROBLEM — O40.3XX0 POLYHYDRAMNIOS IN THIRD TRIMESTER (HCC): Status: RESOLVED | Noted: 2020-09-10 | Resolved: 2020-11-12

## 2020-11-12 PROBLEM — Z34.90 NORMAL PREGNANCY (HCC): Status: RESOLVED | Noted: 2020-11-06 | Resolved: 2020-11-12

## 2020-11-12 PROBLEM — Z34.90 NORMAL PREGNANCY: Status: RESOLVED | Noted: 2020-11-06 | Resolved: 2020-11-12

## 2020-11-12 PROBLEM — O99.210 OBESITY AFFECTING PREGNANCY, ANTEPARTUM: Status: RESOLVED | Noted: 2020-10-12 | Resolved: 2020-11-12

## 2020-11-12 PROBLEM — O40.3XX0 POLYHYDRAMNIOS IN THIRD TRIMESTER: Status: RESOLVED | Noted: 2020-09-10 | Resolved: 2020-11-12

## 2020-11-12 PROCEDURE — 99213 OFFICE O/P EST LOW 20 MIN: CPT | Performed by: NURSE PRACTITIONER

## 2020-11-12 NOTE — PROGRESS NOTES
Virtual Telephone Check-In    Willian Liz verbally consents to a Virtual/Telephone Check-In visit on 11/12/20. Patient verified identification with name and date of birth.      Patient understands and accepts financial responsibility for any dedu antepartum 10/12/2020    36w: NST weekly   • Viral upper respiratory tract infection with cough 2/19/2016      Social History:  Social History    Tobacco Use      Smoking status: Never Smoker      Smokeless tobacco: Never Used    Alcohol use: Not Currently ordered as detailed in the plan of care above.

## 2020-11-13 ENCOUNTER — APPOINTMENT (OUTPATIENT)
Dept: LAB | Age: 30
End: 2020-11-13
Attending: NURSE PRACTITIONER
Payer: COMMERCIAL

## 2020-11-13 DIAGNOSIS — Z20.828 EXPOSURE TO SARS-ASSOCIATED CORONAVIRUS: ICD-10-CM

## 2020-11-16 ENCOUNTER — TELEPHONE (OUTPATIENT)
Dept: INTERNAL MEDICINE CLINIC | Facility: CLINIC | Age: 30
End: 2020-11-16

## 2020-11-16 NOTE — TELEPHONE ENCOUNTER
Spoke with patient notified TO recommends going to ER for further evaluation to rule out DVT, HA may or may not be related. Patient verbalized understanding and agreeable to POC.

## 2020-11-16 NOTE — TELEPHONE ENCOUNTER
Patient had a baby last week; she is experiencing headaches since then. Yesterday her R foot and ankle have become swollen all the way down to her arch of foot; please call to triage further.

## 2020-11-16 NOTE — TELEPHONE ENCOUNTER
Spoke with pt delivered baby 1 week ago, since last Wednesday 11/11/2020 has HA on and off, rates pain 3/10, patient denies blurred vision, dizziness, or any other symptom at this time. Patient has taken Ibuprofen/Tylenol.      Patient also indicates her ri

## 2021-02-10 ENCOUNTER — OFFICE VISIT (OUTPATIENT)
Dept: INTERNAL MEDICINE CLINIC | Facility: CLINIC | Age: 31
End: 2021-02-10
Payer: MEDICAID

## 2021-02-10 VITALS
RESPIRATION RATE: 16 BRPM | HEIGHT: 65 IN | BODY MASS INDEX: 32.36 KG/M2 | SYSTOLIC BLOOD PRESSURE: 122 MMHG | HEART RATE: 76 BPM | OXYGEN SATURATION: 98 % | DIASTOLIC BLOOD PRESSURE: 72 MMHG | TEMPERATURE: 98 F | WEIGHT: 194.25 LBS

## 2021-02-10 DIAGNOSIS — R51.9 NONINTRACTABLE HEADACHE, UNSPECIFIED CHRONICITY PATTERN, UNSPECIFIED HEADACHE TYPE: ICD-10-CM

## 2021-02-10 DIAGNOSIS — R20.2 NUMBNESS AND TINGLING OF RIGHT LEG: ICD-10-CM

## 2021-02-10 DIAGNOSIS — R20.2 FACIAL TINGLING: Primary | ICD-10-CM

## 2021-02-10 DIAGNOSIS — R29.818: ICD-10-CM

## 2021-02-10 DIAGNOSIS — R20.0 NUMBNESS AND TINGLING OF RIGHT LEG: ICD-10-CM

## 2021-02-10 PROCEDURE — 99214 OFFICE O/P EST MOD 30 MIN: CPT | Performed by: FAMILY MEDICINE

## 2021-02-10 PROCEDURE — 3074F SYST BP LT 130 MM HG: CPT | Performed by: FAMILY MEDICINE

## 2021-02-10 PROCEDURE — 3008F BODY MASS INDEX DOCD: CPT | Performed by: FAMILY MEDICINE

## 2021-02-10 PROCEDURE — 3078F DIAST BP <80 MM HG: CPT | Performed by: FAMILY MEDICINE

## 2021-02-10 NOTE — PROGRESS NOTES
Farzana Foster is a 35year old female. HPI:   Got the COVID vaccine 3 weeks ago and in the next week started to get a tingling on the R facial area and mouth    Has a new job last month for 1 week prior to the first vaccine.    Sits in chair at wo 12/19/2020 (Exact Date)   SpO2 98%   BMI 32.32 kg/m²   GENERAL: well developed, well nourished,in no apparent distress  SKIN: no rashes  HEENT:  NCAT   PERRLA   EOMI   CN intact grossly   NECK: supple,no adenopathy  DTR 2/2     ASSESSMENT AND PLAN:   Ember Eastman

## 2021-02-17 ENCOUNTER — HOSPITAL ENCOUNTER (OUTPATIENT)
Dept: MRI IMAGING | Age: 31
Discharge: HOME | End: 2021-02-17
Attending: FAMILY MEDICINE
Payer: MEDICAID

## 2021-02-17 DIAGNOSIS — R20.0 NUMBNESS AND TINGLING OF RIGHT LEG: ICD-10-CM

## 2021-02-17 DIAGNOSIS — R20.2 NUMBNESS AND TINGLING OF RIGHT LEG: ICD-10-CM

## 2021-02-17 DIAGNOSIS — R20.2 FACIAL TINGLING: ICD-10-CM

## 2021-02-17 DIAGNOSIS — R29.818: ICD-10-CM

## 2021-02-17 DIAGNOSIS — R51.9 NONINTRACTABLE HEADACHE, UNSPECIFIED CHRONICITY PATTERN, UNSPECIFIED HEADACHE TYPE: ICD-10-CM

## 2021-02-17 PROCEDURE — 70551 MRI BRAIN STEM W/O DYE: CPT | Performed by: FAMILY MEDICINE

## 2021-02-22 ENCOUNTER — OFFICE VISIT (OUTPATIENT)
Dept: NEUROLOGY | Facility: CLINIC | Age: 31
End: 2021-02-22
Payer: MEDICAID

## 2021-02-22 ENCOUNTER — TELEPHONE (OUTPATIENT)
Dept: NEUROLOGY | Facility: CLINIC | Age: 31
End: 2021-02-22

## 2021-02-22 VITALS
SYSTOLIC BLOOD PRESSURE: 124 MMHG | WEIGHT: 190 LBS | HEART RATE: 74 BPM | RESPIRATION RATE: 16 BRPM | DIASTOLIC BLOOD PRESSURE: 72 MMHG | HEIGHT: 65 IN | BODY MASS INDEX: 31.65 KG/M2

## 2021-02-22 DIAGNOSIS — R90.82 WHITE MATTER ABNORMALITY ON MRI OF BRAIN: Primary | ICD-10-CM

## 2021-02-22 PROCEDURE — 99203 OFFICE O/P NEW LOW 30 MIN: CPT | Performed by: PHYSICIAN ASSISTANT

## 2021-02-22 PROCEDURE — 3078F DIAST BP <80 MM HG: CPT | Performed by: PHYSICIAN ASSISTANT

## 2021-02-22 PROCEDURE — 3008F BODY MASS INDEX DOCD: CPT | Performed by: PHYSICIAN ASSISTANT

## 2021-02-22 PROCEDURE — 3074F SYST BP LT 130 MM HG: CPT | Performed by: PHYSICIAN ASSISTANT

## 2021-02-22 NOTE — PROGRESS NOTES
[facility] with [facility]  Urbano Mcdonald  5/3/1990  Primary Care Provider:  Rayne Casarez MD    2/22/2021  Accompanied visit: No    35year old female patient being seen for: Abnormal M Seasonal                Runny nose         EXAM:  /72 (BP Location: Left arm, Patient Position: Sitting, Cuff Size: large)   Pulse 74   Resp 16   Ht 65\"   Wt 190 lb (86.2 kg)   LMP 12/19/2020 (Exact Date)   BMI 31.62 kg/m²   Looks stated age  Almas Neves

## 2021-02-22 NOTE — TELEPHONE ENCOUNTER
Pts current insurance is ending on 3/1/21 and hoping to get prior authorization and appt for her MRI tests prior to that date. Kori Gonzales advised would send pa team a note to get pa done quickly so pt can schedule appt.

## 2021-02-26 ENCOUNTER — HOSPITAL ENCOUNTER (OUTPATIENT)
Dept: MRI IMAGING | Facility: HOSPITAL | Age: 31
Discharge: HOME | End: 2021-02-26
Attending: PHYSICIAN ASSISTANT
Payer: MEDICAID

## 2021-02-26 DIAGNOSIS — R90.82 WHITE MATTER ABNORMALITY ON MRI OF BRAIN: ICD-10-CM

## 2021-02-26 PROCEDURE — A9575 INJ GADOTERATE MEGLUMI 0.1ML: HCPCS | Performed by: PHYSICIAN ASSISTANT

## 2021-02-26 PROCEDURE — 72156 MRI NECK SPINE W/O & W/DYE: CPT | Performed by: PHYSICIAN ASSISTANT

## 2021-02-26 PROCEDURE — 72157 MRI CHEST SPINE W/O & W/DYE: CPT | Performed by: PHYSICIAN ASSISTANT

## 2021-03-01 ENCOUNTER — TELEPHONE (OUTPATIENT)
Dept: NEUROLOGY | Facility: CLINIC | Age: 31
End: 2021-03-01

## 2021-03-01 NOTE — TELEPHONE ENCOUNTER
Left voice-mail message informing patient that MRI Cervical and Thoracic Spine showed no lesions. I asked her to call back to discuss next steps.

## 2021-03-01 NOTE — TELEPHONE ENCOUNTER
Patient called, states that she received her MRI results and would like a call back from [address] to review. States that she is working until 7 and that a detailed message can be left.     She also wants to make sure that she can keep the appointment scheduled

## 2021-03-03 ENCOUNTER — TELEPHONE (OUTPATIENT)
Dept: NEUROLOGY | Facility: CLINIC | Age: 31
End: 2021-03-03

## 2021-03-03 DIAGNOSIS — R20.2 PARESTHESIAS: ICD-10-CM

## 2021-03-03 DIAGNOSIS — R90.89 ABNORMAL FINDING ON MRI OF BRAIN: Primary | ICD-10-CM

## 2021-03-03 NOTE — TELEPHONE ENCOUNTER
Spoke to patient and with her symptoms and MRI findings would recommend proceeding with LP for further evaluation. She was in agreement. She will call central scheduling to schedule LP.

## 2021-03-10 ENCOUNTER — TELEPHONE (OUTPATIENT)
Dept: NEUROLOGY | Facility: CLINIC | Age: 31
End: 2021-03-10

## 2021-03-10 ENCOUNTER — OFFICE VISIT (OUTPATIENT)
Dept: NEUROLOGY | Facility: CLINIC | Age: 31
End: 2021-03-10
Payer: COMMERCIAL

## 2021-03-10 VITALS
HEART RATE: 74 BPM | DIASTOLIC BLOOD PRESSURE: 70 MMHG | HEIGHT: 65 IN | BODY MASS INDEX: 30.66 KG/M2 | WEIGHT: 184 LBS | RESPIRATION RATE: 16 BRPM | SYSTOLIC BLOOD PRESSURE: 120 MMHG

## 2021-03-10 DIAGNOSIS — R90.82 WHITE MATTER ABNORMALITY ON MRI OF BRAIN: Primary | ICD-10-CM

## 2021-03-10 DIAGNOSIS — R20.2 PARESTHESIAS: ICD-10-CM

## 2021-03-10 PROCEDURE — 3008F BODY MASS INDEX DOCD: CPT | Performed by: PHYSICIAN ASSISTANT

## 2021-03-10 PROCEDURE — 3074F SYST BP LT 130 MM HG: CPT | Performed by: PHYSICIAN ASSISTANT

## 2021-03-10 PROCEDURE — 99214 OFFICE O/P EST MOD 30 MIN: CPT | Performed by: PHYSICIAN ASSISTANT

## 2021-03-10 PROCEDURE — 3078F DIAST BP <80 MM HG: CPT | Performed by: PHYSICIAN ASSISTANT

## 2021-03-10 RX ORDER — MULTIVIT-MIN/IRON/FOLIC ACID/K 18-600-40
4000 CAPSULE ORAL DAILY
COMMUNITY

## 2021-03-10 NOTE — PROGRESS NOTES
[facility] with [address]  5/3/1990  Primary Care Provider:  Wilmer Myers MD    3/10/2021  Accompanied visit: Yes- Sister    Previous visit and existing record notes rev are mild patchy and focal areas of T2/FLAIR hyperintensity in the supratentorial white matter.  Some of these lesions localize to the bilateral periatrial white matter.  There are subtle smaller lesions with a radial configuration and   perpendicular orien II-XII intact  Strength 5/5 all extremities  DTRs 2+ and symmetric  +babinski bilaterally  FTN intact bilaterally. No drift on exam  Normal gait. Tandem gait intact.  Romberg negative  Straight leg raise test negative bilaterally  Able to walk on heels and

## 2021-03-10 NOTE — TELEPHONE ENCOUNTER
At check out pt requested medical records from 2/22/21 to 3/10/21. Gave pt medical records that were completed. Mailed todays office visit note to pts address on [facility].     Copy to scanning

## 2021-03-11 ENCOUNTER — TELEPHONE (OUTPATIENT)
Dept: NEUROLOGY | Facility: CLINIC | Age: 31
End: 2021-03-11

## 2021-03-11 DIAGNOSIS — R90.89 ABNORMAL FINDING ON MRI OF BRAIN: Primary | ICD-10-CM

## 2021-03-11 NOTE — TELEPHONE ENCOUNTER
Labs re-entered in computer to reflect external resulting. Left message for patient that labs can be drawn when she has her LP. Informed that orders will not be faxed until she notifies office. Will await call back.

## 2021-03-11 NOTE — TELEPHONE ENCOUNTER
Patient called, advised she is going to get labs done where she works at [employer]. She would like orders faxed to her at: [phone]. Can we please create external lab draw order and fax to patient?

## 2021-03-11 NOTE — TELEPHONE ENCOUNTER
Patient returned nurses call. Patient states she is scheduled for LP the 17th. Will have labs drawn at [facility]. External lab orders cancelled.

## 2021-03-12 ENCOUNTER — LAB ENCOUNTER (OUTPATIENT)
Dept: LAB | Age: 31
End: 2021-03-12
Attending: PHYSICIAN ASSISTANT
Payer: COMMERCIAL

## 2021-03-12 RX ORDER — MULTIVIT WITH CALCIUM,IRON,MIN
1 TABLET ORAL DAILY
COMMUNITY

## 2021-03-14 ENCOUNTER — LAB ENCOUNTER (OUTPATIENT)
Dept: LAB | Facility: HOSPITAL | Age: 31
End: 2021-03-14
Attending: PHYSICIAN ASSISTANT
Payer: COMMERCIAL

## 2021-03-14 DIAGNOSIS — R20.2 PARESTHESIAS: ICD-10-CM

## 2021-03-14 DIAGNOSIS — R90.89 ABNORMAL FINDING ON MRI OF BRAIN: ICD-10-CM

## 2021-03-14 LAB — SARS-COV-2 RNA RESP QL NAA+PROBE: NOT DETECTED

## 2021-03-17 ENCOUNTER — HOSPITAL ENCOUNTER (OUTPATIENT)
Dept: GENERAL RADIOLOGY | Facility: HOSPITAL | Age: 31
Discharge: HOME | End: 2021-03-17
Attending: PHYSICIAN ASSISTANT
Payer: COMMERCIAL

## 2021-03-17 ENCOUNTER — NURSE ONLY (OUTPATIENT)
Dept: LAB | Facility: HOSPITAL | Age: 31
End: 2021-03-17
Attending: PHYSICIAN ASSISTANT
Payer: COMMERCIAL

## 2021-03-17 VITALS
HEIGHT: 65.5 IN | OXYGEN SATURATION: 100 % | RESPIRATION RATE: 16 BRPM | TEMPERATURE: 99 F | WEIGHT: 183 LBS | DIASTOLIC BLOOD PRESSURE: 67 MMHG | SYSTOLIC BLOOD PRESSURE: 105 MMHG | BODY MASS INDEX: 30.12 KG/M2 | HEART RATE: 69 BPM

## 2021-03-17 DIAGNOSIS — R90.89 ABNORMAL FINDING ON MRI OF BRAIN: ICD-10-CM

## 2021-03-17 DIAGNOSIS — R20.2 PARESTHESIAS: ICD-10-CM

## 2021-03-17 DIAGNOSIS — R90.89 ABNORMAL FINDING ON MRI OF BRAIN: Primary | ICD-10-CM

## 2021-03-17 LAB
BASOPHILS # BLD AUTO: 0.02 X10(3) UL (ref 0–0.2)
BASOPHILS NFR BLD AUTO: 0.4 %
CLARITY CSF: CLEAR
COLOR CSF: COLORLESS
COUNT PERFORMED ON TUBE: 4
DEPRECATED RDW RBC AUTO: 41 FL (ref 35.1–46.3)
EOSINOPHIL # BLD AUTO: 0.06 X10(3) UL (ref 0–0.7)
EOSINOPHIL NFR BLD AUTO: 1.3 %
ERYTHROCYTE [DISTWIDTH] IN BLOOD BY AUTOMATED COUNT: 12.5 % (ref 11–15)
GLUCOSE CSF-MCNC: 57 MG/DL (ref 40–70)
HCG URINE QUALITATIVE: NEGATIVE
HCT VFR BLD AUTO: 40.4 %
HGB BLD-MCNC: 13.4 G/DL
IMM GRANULOCYTES # BLD AUTO: 0.01 X10(3) UL (ref 0–1)
IMM GRANULOCYTES NFR BLD: 0.2 %
INR BLD: 0.97 (ref 0.89–1.11)
LYMPHOCYTES # BLD AUTO: 1.38 X10(3) UL (ref 1–4)
LYMPHOCYTES NFR BLD AUTO: 30 %
MCH RBC QN AUTO: 30.3 PG (ref 26–34)
MCHC RBC AUTO-ENTMCNC: 33.2 G/DL (ref 31–37)
MCV RBC AUTO: 91.4 FL
MONOCYTES # BLD AUTO: 0.32 X10(3) UL (ref 0.1–1)
MONOCYTES NFR BLD AUTO: 7 %
NEUTROPHILS # BLD AUTO: 2.81 X10 (3) UL (ref 1.5–7.7)
NEUTROPHILS # BLD AUTO: 2.81 X10(3) UL (ref 1.5–7.7)
NEUTROPHILS NFR BLD AUTO: 61.1 %
PLATELET # BLD AUTO: 141 10(3)UL (ref 150–450)
PROT PATTERN CSF ELPH-IMP: 39.2 MG/DL (ref 15–45)
PSA SERPL DL<=0.01 NG/ML-MCNC: 13.2 SECONDS (ref 12.4–14.6)
RBC # BLD AUTO: 4.42 X10(6)UL
RBC # CSF: 1 /MM3
TOTAL VOLUME CSF: 11 ML
WBC # BLD AUTO: 4.6 X10(3) UL (ref 4–11)
WBC # CSF: 1 /MM3 (ref 0–5)

## 2021-03-17 PROCEDURE — 82784 ASSAY IGA/IGD/IGG/IGM EACH: CPT

## 2021-03-17 PROCEDURE — 82945 GLUCOSE OTHER FLUID: CPT

## 2021-03-17 PROCEDURE — 82042 OTHER SOURCE ALBUMIN QUAN EA: CPT

## 2021-03-17 PROCEDURE — 89050 BODY FLUID CELL COUNT: CPT

## 2021-03-17 PROCEDURE — 82040 ASSAY OF SERUM ALBUMIN: CPT

## 2021-03-17 PROCEDURE — 83916 OLIGOCLONAL BANDS: CPT

## 2021-03-17 PROCEDURE — 86618 LYME DISEASE ANTIBODY: CPT

## 2021-03-17 PROCEDURE — 82164 ANGIOTENSIN I ENZYME TEST: CPT

## 2021-03-17 PROCEDURE — 36415 COLL VENOUS BLD VENIPUNCTURE: CPT

## 2021-03-17 PROCEDURE — 84157 ASSAY OF PROTEIN OTHER: CPT

## 2021-03-17 PROCEDURE — 84165 PROTEIN E-PHORESIS SERUM: CPT

## 2021-03-17 PROCEDURE — 62328 DX LMBR SPI PNXR W/FLUOR/CT: CPT | Performed by: PHYSICIAN ASSISTANT

## 2021-03-17 PROCEDURE — 85025 COMPLETE CBC W/AUTO DIFF WBC: CPT | Performed by: RADIOLOGY

## 2021-03-17 PROCEDURE — 81025 URINE PREGNANCY TEST: CPT

## 2021-03-17 PROCEDURE — 85610 PROTHROMBIN TIME: CPT

## 2021-03-17 RX ORDER — SODIUM CHLORIDE 9 MG/ML
INJECTION, SOLUTION INTRAVENOUS CONTINUOUS
Status: DISCONTINUED | OUTPATIENT
Start: 2021-03-17 | End: 2021-03-19

## 2021-03-18 ENCOUNTER — APPOINTMENT (OUTPATIENT)
Dept: LAB | Facility: HOSPITAL | Age: 31
End: 2021-03-18
Attending: PHYSICIAN ASSISTANT
Payer: MEDICAID

## 2021-03-18 ENCOUNTER — HOSPITAL ENCOUNTER (OUTPATIENT)
Dept: GENERAL RADIOLOGY | Facility: HOSPITAL | Age: 31
Discharge: HOME | End: 2021-03-18
Attending: PHYSICIAN ASSISTANT
Payer: MEDICAID

## 2021-03-19 LAB
ALBUMIN, CSF: 20.8 MG/DL
ALBUMIN, CSF: 28 MG/DL
ALPHA-1, CSF: 1.9 MG/DL
ALPHA-2, CSF: 3.6 MG/DL
ANGIOTENSIN CONVERTING E, CSF: 0.8 U/L
BETA, CSF: 3.8 MG/DL
CSF IGG/ALBUMIN RATIO: 0.07 RATIO
GAMMA, CSF: 1.2 MG/DL
IMMUNOGLOBULIN G CSF: 1.9 MG/DL
PRE-ALBUMIN, CSF: 1.4 MG/DL
TOTAL PROTEIN, CSF: 32.7 MG/DL

## 2021-03-20 ENCOUNTER — ANESTHESIA (OUTPATIENT)
Dept: EMERGENCY DEPT | Facility: HOSPITAL | Age: 31
End: 2021-03-20
Payer: COMMERCIAL

## 2021-03-20 ENCOUNTER — HOSPITAL ENCOUNTER (EMERGENCY)
Facility: HOSPITAL | Age: 31
Discharge: HOME | End: 2021-03-20
Attending: EMERGENCY MEDICINE
Payer: COMMERCIAL

## 2021-03-20 ENCOUNTER — ANESTHESIA EVENT (OUTPATIENT)
Dept: EMERGENCY DEPT | Facility: HOSPITAL | Age: 31
End: 2021-03-20
Payer: COMMERCIAL

## 2021-03-20 VITALS
RESPIRATION RATE: 16 BRPM | SYSTOLIC BLOOD PRESSURE: 125 MMHG | DIASTOLIC BLOOD PRESSURE: 80 MMHG | HEART RATE: 70 BPM | TEMPERATURE: 99 F | HEIGHT: 65 IN | WEIGHT: 185 LBS | BODY MASS INDEX: 30.82 KG/M2 | OXYGEN SATURATION: 99 %

## 2021-03-20 DIAGNOSIS — G97.1 SPINAL HEADACHE: Primary | ICD-10-CM

## 2021-03-20 LAB
ALBUMIN INDEX: 5 RATIO
ALBUMIN, CSF: 24 MG/DL
ALBUMIN, SERUM/PLASMA, NEPH: 4795 MG/DL
B. BURGDORFERI AB BY ELISA,CSF: 0.12 LIV
BASOPHILS # BLD AUTO: 0.01 X10(3) UL (ref 0–0.2)
BASOPHILS NFR BLD AUTO: 0.2 %
CRP SERPL-MCNC: <0.29 MG/DL (ref ?–0.3)
CSF IGG SYNTHESIS RATE: 0.7 MG/D
CSF IGG/ALBUMIN RATIO: 0.08 RATIO
CSF OLIGOCLONAL BANDS NUMBER: 0 BANDS
CSF OLIGOCLONAL BANDS: NEGATIVE
DEPRECATED RDW RBC AUTO: 40.3 FL (ref 35.1–46.3)
EOSINOPHIL # BLD AUTO: 0.06 X10(3) UL (ref 0–0.7)
EOSINOPHIL NFR BLD AUTO: 1 %
ERYTHROCYTE [DISTWIDTH] IN BLOOD BY AUTOMATED COUNT: 12.2 % (ref 11–15)
HCT VFR BLD AUTO: 37.6 %
HGB BLD-MCNC: 12.6 G/DL
IGG INDEX: 0.64 RATIO
IMM GRANULOCYTES # BLD AUTO: 0.01 X10(3) UL (ref 0–1)
IMM GRANULOCYTES NFR BLD: 0.2 %
IMMUNOGLOBULIN G CSF: 1.8 MG/DL
IMMUNOGLOBULIN G: 558 MG/DL
LYMPHOCYTES # BLD AUTO: 1.31 X10(3) UL (ref 1–4)
LYMPHOCYTES NFR BLD AUTO: 21.2 %
MCH RBC QN AUTO: 30.1 PG (ref 26–34)
MCHC RBC AUTO-ENTMCNC: 33.5 G/DL (ref 31–37)
MCV RBC AUTO: 90 FL
MONOCYTES # BLD AUTO: 0.32 X10(3) UL (ref 0.1–1)
MONOCYTES NFR BLD AUTO: 5.2 %
NEUTROPHILS # BLD AUTO: 4.48 X10 (3) UL (ref 1.5–7.7)
NEUTROPHILS # BLD AUTO: 4.48 X10(3) UL (ref 1.5–7.7)
NEUTROPHILS NFR BLD AUTO: 72.2 %
PLATELET # BLD AUTO: 136 10(3)UL (ref 150–450)
RBC # BLD AUTO: 4.18 X10(6)UL
WBC # BLD AUTO: 6.2 X10(3) UL (ref 4–11)

## 2021-03-20 PROCEDURE — 62273 INJECT EPIDURAL PATCH: CPT | Performed by: ANESTHESIOLOGY

## 2021-03-20 PROCEDURE — 96375 TX/PRO/DX INJ NEW DRUG ADDON: CPT

## 2021-03-20 PROCEDURE — 86140 C-REACTIVE PROTEIN: CPT | Performed by: EMERGENCY MEDICINE

## 2021-03-20 PROCEDURE — 96361 HYDRATE IV INFUSION ADD-ON: CPT

## 2021-03-20 PROCEDURE — 99284 EMERGENCY DEPT VISIT MOD MDM: CPT

## 2021-03-20 PROCEDURE — 62273 INJECT EPIDURAL PATCH: CPT

## 2021-03-20 PROCEDURE — 96374 THER/PROPH/DIAG INJ IV PUSH: CPT

## 2021-03-20 PROCEDURE — 99285 EMERGENCY DEPT VISIT HI MDM: CPT

## 2021-03-20 PROCEDURE — 85025 COMPLETE CBC W/AUTO DIFF WBC: CPT | Performed by: EMERGENCY MEDICINE

## 2021-03-20 RX ORDER — MORPHINE SULFATE 4 MG/ML
4 INJECTION, SOLUTION INTRAMUSCULAR; INTRAVENOUS ONCE
Status: COMPLETED | OUTPATIENT
Start: 2021-03-20 | End: 2021-03-20

## 2021-03-20 RX ORDER — ACETAMINOPHEN 500 MG
500 TABLET ORAL EVERY 6 HOURS PRN
COMMUNITY
End: 2021-06-30 | Stop reason: ALTCHOICE

## 2021-03-20 RX ORDER — ONDANSETRON 2 MG/ML
INJECTION INTRAMUSCULAR; INTRAVENOUS
Status: COMPLETED
Start: 2021-03-20 | End: 2021-03-20

## 2021-03-20 RX ORDER — KETOROLAC TROMETHAMINE 30 MG/ML
15 INJECTION, SOLUTION INTRAMUSCULAR; INTRAVENOUS ONCE
Status: COMPLETED | OUTPATIENT
Start: 2021-03-20 | End: 2021-03-20

## 2021-03-20 RX ORDER — ONDANSETRON 2 MG/ML
4 INJECTION INTRAMUSCULAR; INTRAVENOUS ONCE
Status: COMPLETED | OUTPATIENT
Start: 2021-03-20 | End: 2021-03-20

## 2021-03-20 NOTE — ED NOTES
pts mother at [facility]. Pt cleared for discharge per MD. Pt standing at [facility].

## 2021-03-20 NOTE — ED NOTES
Family at bedside. Discharge instructions provided. Pt and family voice understanding. [place] steady. Cleared for discharge.

## 2021-03-20 NOTE — ED INITIAL ASSESSMENT (HPI)
Pt presents to ER status post lumbar puncture on Wednesday. Pt states that since she has been having left sided back pain, pain does not radiate. No loss of bowel or bladder. Today pt twisted and then noted an intense HA. HA on left sided of head.  Yes naus

## 2021-03-20 NOTE — ED PROVIDER NOTES
Patient Seen in: [facility] Emergency Department      History   Patient presents with:  Headache    Stated Complaint: LP x4 days ago- now having headache and back pain    HPI/Subjective:   HPI    This is a 25-year-old female with past medical histor Alcohol use: Not Currently    Drug use: No             Review of Systems    Positive for stated complaint: LP x4 days ago- now having headache and back pain  Other systems are as noted in HPI. Constitutional and vital signs reviewed.       All other system Abnormal            Final result                 Please view results for these tests on the individual orders.    RAINBOW DRAW BLUE   RAINBOW DRAW LAVENDER   RAINBOW DRAW LIGHT GREEN   RAINBOW DRAW GOLD       MDM       IV line was established of normal sali

## 2021-03-20 NOTE — ED NOTES
Movement and standing makes back pain worse. Back pain does not radiate, stays right on left side of lower back.

## 2021-03-22 ENCOUNTER — TELEPHONE (OUTPATIENT)
Dept: NEUROLOGY | Facility: CLINIC | Age: 31
End: 2021-03-22

## 2021-03-22 NOTE — TELEPHONE ENCOUNTER
Spoke to patient and informed her that LP so far showed no oligoclonal bands. Informed her that there were still lab orders to complete. She currently has not had any paresthesias in the last week. Recommended monitoring and if symptoms return to call.  Sh

## 2021-03-23 NOTE — PROGRESS NOTES
CHIEF COMPLAINT:     Patient presents with:  ER F/U: 3/20/2021 dt spinal headache      HPI:   Pratima Correa is a 35year old female patient presents for a recheck after ER visit for complaints of HA after LP on 3/17. Was seen 4 days ago.  History: Use      Vaping Use: Never used    Alcohol use: Not Currently    Drug use: No       REVIEW OF SYSTEMS:   GENERAL: Denies fever, chills  SKIN: Denies rashes, skin wounds or ulcers.   GI: Denies N/V.   MUSCULOSKELETAL: + low back aching/sharp pain  LYMPH:  [place]

## 2021-03-24 ENCOUNTER — LAB ENCOUNTER (OUTPATIENT)
Dept: LAB | Age: 31
End: 2021-03-24
Attending: PHYSICIAN ASSISTANT
Payer: COMMERCIAL

## 2021-03-24 ENCOUNTER — OFFICE VISIT (OUTPATIENT)
Dept: INTERNAL MEDICINE CLINIC | Facility: CLINIC | Age: 31
End: 2021-03-24
Payer: COMMERCIAL

## 2021-03-24 VITALS
BODY MASS INDEX: 31.03 KG/M2 | TEMPERATURE: 99 F | HEIGHT: 65.25 IN | OXYGEN SATURATION: 100 % | HEART RATE: 69 BPM | RESPIRATION RATE: 12 BRPM | DIASTOLIC BLOOD PRESSURE: 72 MMHG | SYSTOLIC BLOOD PRESSURE: 104 MMHG | WEIGHT: 188.5 LBS

## 2021-03-24 DIAGNOSIS — M54.50 ACUTE MIDLINE LOW BACK PAIN WITHOUT SCIATICA: Primary | ICD-10-CM

## 2021-03-24 LAB
CRP SERPL-MCNC: 1.46 MG/DL (ref ?–0.3)
RHEUMATOID FACT SERPL-ACNC: <10 IU/ML (ref ?–15)
SED RATE-ML: 9 MM/HR
VIT B12 SERPL-MCNC: 350 PG/ML (ref 193–986)
VIT D+METAB SERPL-MCNC: 50 NG/ML (ref 30–100)

## 2021-03-24 PROCEDURE — 86038 ANTINUCLEAR ANTIBODIES: CPT | Performed by: PHYSICIAN ASSISTANT

## 2021-03-24 PROCEDURE — 99213 OFFICE O/P EST LOW 20 MIN: CPT | Performed by: NURSE PRACTITIONER

## 2021-03-24 PROCEDURE — 82306 VITAMIN D 25 HYDROXY: CPT | Performed by: PHYSICIAN ASSISTANT

## 2021-03-24 PROCEDURE — 82607 VITAMIN B-12: CPT | Performed by: PHYSICIAN ASSISTANT

## 2021-03-24 PROCEDURE — 86140 C-REACTIVE PROTEIN: CPT | Performed by: PHYSICIAN ASSISTANT

## 2021-03-24 PROCEDURE — 85652 RBC SED RATE AUTOMATED: CPT | Performed by: PHYSICIAN ASSISTANT

## 2021-03-24 PROCEDURE — 3074F SYST BP LT 130 MM HG: CPT | Performed by: NURSE PRACTITIONER

## 2021-03-24 PROCEDURE — 36415 COLL VENOUS BLD VENIPUNCTURE: CPT | Performed by: PHYSICIAN ASSISTANT

## 2021-03-24 PROCEDURE — 3078F DIAST BP <80 MM HG: CPT | Performed by: NURSE PRACTITIONER

## 2021-03-24 PROCEDURE — 86431 RHEUMATOID FACTOR QUANT: CPT | Performed by: PHYSICIAN ASSISTANT

## 2021-03-24 PROCEDURE — 3008F BODY MASS INDEX DOCD: CPT | Performed by: NURSE PRACTITIONER

## 2021-03-29 ENCOUNTER — TELEPHONE (OUTPATIENT)
Dept: NEUROLOGY | Facility: CLINIC | Age: 31
End: 2021-03-29

## 2021-03-29 LAB — ANA SCREEN: NEGATIVE

## 2021-03-29 NOTE — TELEPHONE ENCOUNTER
Spoke to patient and informed her the rest of her lab results. Let her know that her vitamin B12 level was slightly low and recommended taking vitamin B12 1000mcg daily.  Also informed her that I reviewed her MRI Cervical spine with Dr. Vadim Alarcon and he fel

## 2021-05-18 ENCOUNTER — HOSPITAL ENCOUNTER (OUTPATIENT)
Dept: MRI IMAGING | Facility: HOSPITAL | Age: 31
Discharge: HOME | End: 2021-05-18
Attending: Other
Payer: COMMERCIAL

## 2021-05-18 DIAGNOSIS — R20.2 PARESTHESIA: ICD-10-CM

## 2021-05-18 DIAGNOSIS — R90.89 ABNORMAL BRAIN MRI: ICD-10-CM

## 2021-05-18 PROCEDURE — A9575 INJ GADOTERATE MEGLUMI 0.1ML: HCPCS

## 2021-05-18 PROCEDURE — 72158 MRI LUMBAR SPINE W/O & W/DYE: CPT | Performed by: OTHER

## 2021-05-18 PROCEDURE — 70553 MRI BRAIN STEM W/O & W/DYE: CPT | Performed by: OTHER

## 2021-05-18 PROCEDURE — 72157 MRI CHEST SPINE W/O & W/DYE: CPT | Performed by: OTHER

## 2021-06-30 ENCOUNTER — OFFICE VISIT (OUTPATIENT)
Dept: INTERNAL MEDICINE CLINIC | Facility: CLINIC | Age: 31
End: 2021-06-30
Payer: COMMERCIAL

## 2021-06-30 VITALS
DIASTOLIC BLOOD PRESSURE: 72 MMHG | RESPIRATION RATE: 16 BRPM | HEART RATE: 76 BPM | WEIGHT: 189 LBS | BODY MASS INDEX: 31.11 KG/M2 | HEIGHT: 65.25 IN | TEMPERATURE: 98 F | SYSTOLIC BLOOD PRESSURE: 126 MMHG

## 2021-06-30 DIAGNOSIS — R41.840 LACK OF CONCENTRATION: ICD-10-CM

## 2021-06-30 DIAGNOSIS — R53.83 FATIGUE, UNSPECIFIED TYPE: Primary | ICD-10-CM

## 2021-06-30 PROCEDURE — 3074F SYST BP LT 130 MM HG: CPT | Performed by: FAMILY MEDICINE

## 2021-06-30 PROCEDURE — 99214 OFFICE O/P EST MOD 30 MIN: CPT | Performed by: FAMILY MEDICINE

## 2021-06-30 PROCEDURE — 3078F DIAST BP <80 MM HG: CPT | Performed by: FAMILY MEDICINE

## 2021-06-30 PROCEDURE — 3008F BODY MASS INDEX DOCD: CPT | Performed by: FAMILY MEDICINE

## 2021-06-30 RX ORDER — MAGNESIUM OXIDE 400 MG (241.3 MG MAGNESIUM) TABLET
100 TABLET DAILY
COMMUNITY

## 2021-06-30 NOTE — PROGRESS NOTES
CHIEF COMPLAINT:     Patient presents with:  Concentration: Been going on for a couple months, got vaccine and pt thinks it is from that. Fatigue: Pt has 2 babies, doesn't know if it is related.        HPI:   Jagdish Ceja is a 28year old female 10/12/2020    36w: NST weekly   • PONV (postoperative nausea and vomiting)     1 episode   • Viral upper respiratory tract infection with cough 2/19/2016      Social History:  Social History    Tobacco Use      Smoking status: Never Smoker      Smokeless t Chronic/Active Infection      Magnesium      Ferritin      Zinc, Plasma Or Serum      Folic Acid Serum (Folate)      Meds & Refills for this Visit:  Requested Prescriptions      No prescriptions requested or ordered in this encounter       Imaging & Consul

## 2021-07-06 ENCOUNTER — TELEPHONE (OUTPATIENT)
Dept: INTERNAL MEDICINE CLINIC | Facility: CLINIC | Age: 31
End: 2021-07-06

## 2021-07-06 NOTE — TELEPHONE ENCOUNTER
Patient called and stated she was referred to Dr Davide Bourgeois. Patient called to schedule and they were a different tier so she would have to pay a higher deductible. Patient is requesting to know if she can be referred to a different doctor in network.      Ple

## 2021-07-13 ENCOUNTER — LAB ENCOUNTER (OUTPATIENT)
Dept: LAB | Age: 31
End: 2021-07-13
Attending: FAMILY MEDICINE
Payer: COMMERCIAL

## 2021-07-13 DIAGNOSIS — R53.83 FATIGUE, UNSPECIFIED TYPE: ICD-10-CM

## 2021-07-13 LAB
ALBUMIN SERPL-MCNC: 3.9 G/DL (ref 3.4–5)
ALBUMIN/GLOB SERPL: 1.4 {RATIO} (ref 1–2)
ALP LIVER SERPL-CCNC: 33 U/L
ALT SERPL-CCNC: 50 U/L
ANION GAP SERPL CALC-SCNC: 4 MMOL/L (ref 0–18)
AST SERPL-CCNC: 13 U/L (ref 15–37)
BILIRUB SERPL-MCNC: 0.4 MG/DL (ref 0.1–2)
BUN BLD-MCNC: 10 MG/DL (ref 7–18)
BUN/CREAT SERPL: 14.3 (ref 10–20)
CALCIUM BLD-MCNC: 9.1 MG/DL (ref 8.5–10.1)
CHLORIDE SERPL-SCNC: 108 MMOL/L (ref 98–112)
CO2 SERPL-SCNC: 27 MMOL/L (ref 21–32)
CREAT BLD-MCNC: 0.7 MG/DL
DEPRECATED HBV CORE AB SER IA-ACNC: 39.6 NG/ML
FOLATE SERPL-MCNC: 25.3 NG/ML (ref 8.7–?)
GLOBULIN PLAS-MCNC: 2.8 G/DL (ref 2.8–4.4)
GLUCOSE BLD-MCNC: 95 MG/DL (ref 70–99)
HAV IGM SER QL: 2.1 MG/DL (ref 1.6–2.6)
M PROTEIN MFR SERPL ELPH: 6.7 G/DL (ref 6.4–8.2)
OSMOLALITY SERPL CALC.SUM OF ELEC: 287 MOSM/KG (ref 275–295)
POTASSIUM SERPL-SCNC: 4.3 MMOL/L (ref 3.5–5.1)
SODIUM SERPL-SCNC: 139 MMOL/L (ref 136–145)
T4 FREE SERPL-MCNC: 1.1 NG/DL (ref 0.8–1.7)
TSI SER-ACNC: 0.03 MIU/ML (ref 0.36–3.74)

## 2021-07-13 PROCEDURE — 83735 ASSAY OF MAGNESIUM: CPT | Performed by: FAMILY MEDICINE

## 2021-07-13 PROCEDURE — 80053 COMPREHEN METABOLIC PANEL: CPT | Performed by: FAMILY MEDICINE

## 2021-07-13 PROCEDURE — 84443 ASSAY THYROID STIM HORMONE: CPT | Performed by: FAMILY MEDICINE

## 2021-07-13 PROCEDURE — 82746 ASSAY OF FOLIC ACID SERUM: CPT | Performed by: FAMILY MEDICINE

## 2021-07-13 PROCEDURE — 82728 ASSAY OF FERRITIN: CPT | Performed by: FAMILY MEDICINE

## 2021-07-13 PROCEDURE — 84630 ASSAY OF ZINC: CPT | Performed by: FAMILY MEDICINE

## 2021-07-13 PROCEDURE — 84439 ASSAY OF FREE THYROXINE: CPT | Performed by: FAMILY MEDICINE

## 2021-07-13 PROCEDURE — 86665 EPSTEIN-BARR CAPSID VCA: CPT | Performed by: FAMILY MEDICINE

## 2021-07-13 PROCEDURE — 86664 EPSTEIN-BARR NUCLEAR ANTIGEN: CPT | Performed by: FAMILY MEDICINE

## 2021-07-14 ENCOUNTER — TELEPHONE (OUTPATIENT)
Dept: INTERNAL MEDICINE CLINIC | Facility: CLINIC | Age: 31
End: 2021-07-14

## 2021-07-14 DIAGNOSIS — R79.89 ABNORMAL TSH: Primary | ICD-10-CM

## 2021-07-14 LAB
EBV NA IGG SER QL IA: POSITIVE
EBV VCA IGG SER QL IA: POSITIVE
EBV VCA IGM SER QL IA: NEGATIVE

## 2021-07-14 NOTE — TELEPHONE ENCOUNTER
----- Message from CORWIN Troy sent at 7/14/2021  8:11 AM CDT -----  TSH a little low. But T4 wnl. Has Pt been taking any biotin? If not then I would repeat the TSH in 6 weeks because it appears her thyroid is irritated.

## 2021-07-15 LAB — ZINC: 82.8 UG/DL

## 2021-08-09 ENCOUNTER — TELEPHONE (OUTPATIENT)
Dept: NEUROLOGY | Facility: CLINIC | Age: 31
End: 2021-08-09

## 2021-08-09 ENCOUNTER — OFFICE VISIT (OUTPATIENT)
Dept: NEUROLOGY | Facility: CLINIC | Age: 31
End: 2021-08-09
Payer: COMMERCIAL

## 2021-08-09 VITALS
SYSTOLIC BLOOD PRESSURE: 115 MMHG | BODY MASS INDEX: 30.45 KG/M2 | HEART RATE: 77 BPM | DIASTOLIC BLOOD PRESSURE: 65 MMHG | WEIGHT: 185 LBS | HEIGHT: 65.25 IN | RESPIRATION RATE: 16 BRPM

## 2021-08-09 DIAGNOSIS — R42 VERTIGO: ICD-10-CM

## 2021-08-09 DIAGNOSIS — R20.2 PARESTHESIAS: ICD-10-CM

## 2021-08-09 DIAGNOSIS — R90.82 WHITE MATTER ABNORMALITY ON MRI OF BRAIN: Primary | ICD-10-CM

## 2021-08-09 DIAGNOSIS — R90.89 ABNORMAL FINDING ON MRI OF BRAIN: ICD-10-CM

## 2021-08-09 DIAGNOSIS — R26.89 BALANCE PROBLEM: ICD-10-CM

## 2021-08-09 PROCEDURE — 96365 THER/PROPH/DIAG IV INF INIT: CPT | Performed by: OTHER

## 2021-08-09 PROCEDURE — 3008F BODY MASS INDEX DOCD: CPT | Performed by: OTHER

## 2021-08-09 PROCEDURE — 3078F DIAST BP <80 MM HG: CPT | Performed by: OTHER

## 2021-08-09 PROCEDURE — 99214 OFFICE O/P EST MOD 30 MIN: CPT | Performed by: OTHER

## 2021-08-09 PROCEDURE — 3074F SYST BP LT 130 MM HG: CPT | Performed by: OTHER

## 2021-08-09 RX ORDER — METHYLPREDNISOLONE SODIUM SUCCINATE 500 MG/1
500 INJECTION, POWDER, FOR SOLUTION INTRAMUSCULAR; INTRAVENOUS ONCE
Status: COMPLETED | OUTPATIENT
Start: 2021-08-09 | End: 2021-08-09

## 2021-08-09 RX ORDER — METHYLPREDNISOLONE SODIUM SUCCINATE 500 MG/1
500 INJECTION, POWDER, FOR SOLUTION INTRAMUSCULAR; INTRAVENOUS DAILY
Status: COMPLETED | OUTPATIENT
Start: 2021-08-10 | End: 2021-08-11

## 2021-08-09 RX ADMIN — METHYLPREDNISOLONE SODIUM SUCCINATE 500 MG: 500 INJECTION, POWDER, FOR SOLUTION INTRAMUSCULAR; INTRAVENOUS at 09:02:00

## 2021-08-09 NOTE — PROGRESS NOTES
IV started per [facility] and Solu-Medrol infused over 20 minutes without difficulty. IV discontinued per RN. Patient tolerated infusion well with no complications.

## 2021-08-09 NOTE — PROGRESS NOTES
[facility] with [address]  5/3/1990  Primary Care Provider:  Gabriela Garrett MD    8/9/2021  Accompanied visit:      (x) No.      28year old yo patient being seen for:  Pa above.      Medications:      Current Outpatient Medications:   •  Vitamin B12 100 MCG Oral Tab, Take 100 mcg by mouth daily. , Disp: , Rfl:   •  Multiple Vitamins-Minerals (MULTIPLE VITAMINS/WOMENS) Oral Tab, Take 1 tablet by mouth daily. , Disp: , Rfl:   • diagnosis)  Abnormal finding on MRI of brain  Paresthesias  Balance problem  Vertigo      Discussion plus Diagnostics & Treatment Orders:  I reviewed the previous MRI and the brain MRI is indeed impressivel given her young age and lack of any other potenti authorized [facility] member--non F2F  ( x ) other records reviewed --non F2F including consultations  (  ) [facility] meetings - patient not present --non F2F  (  ) Independent Historian obtained    Non Face to Face CPT code 72077/25714 applies as documented above    P

## 2021-08-10 ENCOUNTER — NURSE ONLY (OUTPATIENT)
Dept: NEUROLOGY | Facility: CLINIC | Age: 31
End: 2021-08-10
Payer: COMMERCIAL

## 2021-08-10 VITALS — RESPIRATION RATE: 16 BRPM | SYSTOLIC BLOOD PRESSURE: 110 MMHG | DIASTOLIC BLOOD PRESSURE: 78 MMHG | HEART RATE: 70 BPM

## 2021-08-10 DIAGNOSIS — R90.89 ABNORMAL FINDING ON MRI OF BRAIN: ICD-10-CM

## 2021-08-10 PROCEDURE — 96365 THER/PROPH/DIAG IV INF INIT: CPT | Performed by: OTHER

## 2021-08-10 PROCEDURE — 3078F DIAST BP <80 MM HG: CPT | Performed by: OTHER

## 2021-08-10 PROCEDURE — 3074F SYST BP LT 130 MM HG: CPT | Performed by: OTHER

## 2021-08-10 RX ORDER — PREDNISONE 20 MG/1
TABLET ORAL
Qty: 30 TABLET | Refills: 0 | Status: SHIPPED | OUTPATIENT
Start: 2021-08-12 | End: 2021-10-04 | Stop reason: ALTCHOICE

## 2021-08-10 RX ADMIN — METHYLPREDNISOLONE SODIUM SUCCINATE 500 MG: 500 INJECTION, POWDER, FOR SOLUTION INTRAMUSCULAR; INTRAVENOUS at 09:37:00

## 2021-08-10 NOTE — TELEPHONE ENCOUNTER
[facility] [phone] and spoke with Cate Romero. Code [id number] and 31166 are valid and billable no authorization or predetermination is required. Call reference #[id number].  Time on call 15:09

## 2021-08-11 ENCOUNTER — NURSE ONLY (OUTPATIENT)
Dept: NEUROLOGY | Facility: CLINIC | Age: 31
End: 2021-08-11
Payer: COMMERCIAL

## 2021-08-11 VITALS — DIASTOLIC BLOOD PRESSURE: 74 MMHG | SYSTOLIC BLOOD PRESSURE: 110 MMHG | RESPIRATION RATE: 16 BRPM | HEART RATE: 64 BPM

## 2021-08-11 DIAGNOSIS — R90.89 ABNORMAL FINDING ON MRI OF BRAIN: ICD-10-CM

## 2021-08-11 PROCEDURE — 3078F DIAST BP <80 MM HG: CPT | Performed by: OTHER

## 2021-08-11 PROCEDURE — 3074F SYST BP LT 130 MM HG: CPT | Performed by: OTHER

## 2021-08-11 PROCEDURE — 96365 THER/PROPH/DIAG IV INF INIT: CPT | Performed by: OTHER

## 2021-08-11 RX ADMIN — METHYLPREDNISOLONE SODIUM SUCCINATE 500 MG: 500 INJECTION, POWDER, FOR SOLUTION INTRAMUSCULAR; INTRAVENOUS at 09:01:00

## 2021-08-11 NOTE — PROGRESS NOTES
IV started per [address] and Solu-Medrol infused over 20 minutes without difficulty. IV discontinued per RN. Patient tolerated infusion well with no complications. Pt reports on day three of her solumedrol she is responding to the steroids.  Pt reports less n

## 2021-10-04 ENCOUNTER — OFFICE VISIT (OUTPATIENT)
Dept: NEUROLOGY | Facility: CLINIC | Age: 31
End: 2021-10-04
Payer: COMMERCIAL

## 2021-10-04 VITALS
HEIGHT: 65.25 IN | RESPIRATION RATE: 16 BRPM | WEIGHT: 185 LBS | BODY MASS INDEX: 30.45 KG/M2 | DIASTOLIC BLOOD PRESSURE: 67 MMHG | SYSTOLIC BLOOD PRESSURE: 112 MMHG | HEART RATE: 88 BPM

## 2021-10-04 DIAGNOSIS — R90.89 ABNORMAL FINDING ON MRI OF BRAIN: Primary | ICD-10-CM

## 2021-10-04 DIAGNOSIS — R90.82 WHITE MATTER ABNORMALITY ON MRI OF BRAIN: ICD-10-CM

## 2021-10-04 PROCEDURE — 3008F BODY MASS INDEX DOCD: CPT | Performed by: OTHER

## 2021-10-04 PROCEDURE — 99213 OFFICE O/P EST LOW 20 MIN: CPT | Performed by: OTHER

## 2021-10-04 PROCEDURE — 3074F SYST BP LT 130 MM HG: CPT | Performed by: OTHER

## 2021-10-04 PROCEDURE — 3078F DIAST BP <80 MM HG: CPT | Performed by: OTHER

## 2021-10-04 NOTE — PROGRESS NOTES
[facility] with [address]  5/3/1990  Primary Care Provider:  Ramez Gaines MD    10/4/2021  Accompanied visit:      (x) No.      28year old yo patient being seen for:  A normal  Extremities: no cyanosis, skin changes    NEURO  NEURO  Able to relate events with fluent speech and intact comprehension  CN 2-12 OK  No motor and sensory focal deficit  Gait and coordination OK  Reflexes symmetric and no pathologic reflexes seen issues relevant to visit were raised to staff at this time interval.        This document is to be interpreted as my current opinion regarding the case as of the stated date of service based on the information available to me at this time and may supersede

## 2022-01-19 ENCOUNTER — TELEPHONE (OUTPATIENT)
Dept: NEUROLOGY | Facility: CLINIC | Age: 32
End: 2022-01-19

## 2022-01-19 DIAGNOSIS — R90.82 WHITE MATTER ABNORMALITY ON MRI OF BRAIN: Primary | ICD-10-CM

## 2022-01-19 NOTE — TELEPHONE ENCOUNTER
Spoke with patient who plans on getting MRI done at [facility]. MRI Brain order placed and pended for edit/signature. LOVN;    Discussion plus Diagnostics & Treatment Orders:   The \"response to steroid\" is not that totally convincing although there was kortney

## 2022-01-19 NOTE — TELEPHONE ENCOUNTER
Pt stated she needs to get MRI imaging completed in March, 2022 and asking for orders to be placed in epic. Call pt to advise. PA needs to be advised orders placed and pt completing in March, 2022.

## 2022-02-14 ENCOUNTER — TELEPHONE (OUTPATIENT)
Dept: NEUROLOGY | Facility: CLINIC | Age: 32
End: 2022-02-14

## 2022-02-14 NOTE — TELEPHONE ENCOUNTER
Spoke with the patient and asked to see me or Kasi Carballo to decide whether EMG needed    Dr Ladonna Hammond

## 2022-02-17 ENCOUNTER — OFFICE VISIT (OUTPATIENT)
Dept: NEUROLOGY | Facility: CLINIC | Age: 32
End: 2022-02-17
Payer: COMMERCIAL

## 2022-02-17 VITALS
HEIGHT: 65.25 IN | HEART RATE: 77 BPM | WEIGHT: 180 LBS | DIASTOLIC BLOOD PRESSURE: 66 MMHG | BODY MASS INDEX: 29.63 KG/M2 | RESPIRATION RATE: 16 BRPM | SYSTOLIC BLOOD PRESSURE: 128 MMHG

## 2022-02-17 DIAGNOSIS — R20.2 PARESTHESIAS: Primary | ICD-10-CM

## 2022-02-17 DIAGNOSIS — R90.89 ABNORMAL FINDING ON MRI OF BRAIN: ICD-10-CM

## 2022-02-17 PROCEDURE — 3008F BODY MASS INDEX DOCD: CPT | Performed by: PHYSICIAN ASSISTANT

## 2022-02-17 PROCEDURE — 99213 OFFICE O/P EST LOW 20 MIN: CPT | Performed by: PHYSICIAN ASSISTANT

## 2022-02-17 PROCEDURE — 3074F SYST BP LT 130 MM HG: CPT | Performed by: PHYSICIAN ASSISTANT

## 2022-02-17 PROCEDURE — 3078F DIAST BP <80 MM HG: CPT | Performed by: PHYSICIAN ASSISTANT

## 2022-03-04 ENCOUNTER — PROCEDURE VISIT (OUTPATIENT)
Dept: NEUROLOGY | Facility: CLINIC | Age: 32
End: 2022-03-04
Payer: COMMERCIAL

## 2022-03-04 PROCEDURE — 95913 NRV CNDJ TEST 13/> STUDIES: CPT | Performed by: OTHER

## 2022-03-04 PROCEDURE — 95886 MUSC TEST DONE W/N TEST COMP: CPT | Performed by: OTHER

## 2022-03-07 ENCOUNTER — TELEPHONE (OUTPATIENT)
Dept: NEUROLOGY | Facility: CLINIC | Age: 32
End: 2022-03-07

## 2022-03-07 NOTE — TELEPHONE ENCOUNTER
Patient informed of EMG results. Her symptoms have improved in the right arm since her visit. She is scheduled to do MRI Brain so we will await those results.

## 2022-03-22 ENCOUNTER — HOSPITAL ENCOUNTER (OUTPATIENT)
Dept: MRI IMAGING | Facility: HOSPITAL | Age: 32
Discharge: HOME | End: 2022-03-22
Attending: Other
Payer: COMMERCIAL

## 2022-03-22 DIAGNOSIS — R90.82 WHITE MATTER ABNORMALITY ON MRI OF BRAIN: ICD-10-CM

## 2022-03-22 PROCEDURE — A9575 INJ GADOTERATE MEGLUMI 0.1ML: HCPCS | Performed by: OTHER

## 2022-03-22 PROCEDURE — 70553 MRI BRAIN STEM W/O & W/DYE: CPT | Performed by: OTHER

## 2022-03-28 NOTE — PROGRESS NOTES
Dolores    MRI showed unchanging and stable lesions. MS lesion are expected to change and progress over time.   Although this does not rule out MS, the serial MRI studies would be essential and a  total of at least 3 yearsfollow up should be done    Dr. Deniz Cox

## 2022-04-12 ENCOUNTER — OFFICE VISIT (OUTPATIENT)
Dept: INTERNAL MEDICINE CLINIC | Facility: CLINIC | Age: 32
End: 2022-04-12
Payer: COMMERCIAL

## 2022-04-12 VITALS
RESPIRATION RATE: 14 BRPM | DIASTOLIC BLOOD PRESSURE: 62 MMHG | SYSTOLIC BLOOD PRESSURE: 122 MMHG | OXYGEN SATURATION: 97 % | BODY MASS INDEX: 29.41 KG/M2 | TEMPERATURE: 98 F | HEART RATE: 117 BPM | HEIGHT: 66 IN | WEIGHT: 183 LBS

## 2022-04-12 DIAGNOSIS — R42 DIZZINESS: ICD-10-CM

## 2022-04-12 DIAGNOSIS — K21.9 GASTROESOPHAGEAL REFLUX DISEASE WITHOUT ESOPHAGITIS: ICD-10-CM

## 2022-04-12 DIAGNOSIS — R41.840 ATTENTION AND CONCENTRATION DEFICIT: ICD-10-CM

## 2022-04-12 DIAGNOSIS — F41.9 ANXIETY: ICD-10-CM

## 2022-04-12 DIAGNOSIS — R07.89 CHEST PRESSURE: Primary | ICD-10-CM

## 2022-04-12 PROCEDURE — 3008F BODY MASS INDEX DOCD: CPT | Performed by: FAMILY MEDICINE

## 2022-04-12 PROCEDURE — 99214 OFFICE O/P EST MOD 30 MIN: CPT | Performed by: FAMILY MEDICINE

## 2022-04-12 PROCEDURE — 3074F SYST BP LT 130 MM HG: CPT | Performed by: FAMILY MEDICINE

## 2022-04-12 PROCEDURE — 3078F DIAST BP <80 MM HG: CPT | Performed by: FAMILY MEDICINE

## 2022-04-12 RX ORDER — FAMOTIDINE 40 MG/1
40 TABLET, FILM COATED ORAL DAILY
Qty: 30 TABLET | Refills: 0 | Status: SHIPPED | OUTPATIENT
Start: 2022-04-12

## 2022-04-16 ENCOUNTER — HOSPITAL ENCOUNTER (OUTPATIENT)
Dept: GENERAL RADIOLOGY | Age: 32
Discharge: HOME | End: 2022-04-16
Attending: FAMILY MEDICINE
Payer: COMMERCIAL

## 2022-04-16 DIAGNOSIS — R07.89 CHEST PRESSURE: ICD-10-CM

## 2022-04-16 PROCEDURE — 71046 X-RAY EXAM CHEST 2 VIEWS: CPT | Performed by: FAMILY MEDICINE

## 2022-05-04 ENCOUNTER — TELEPHONE (OUTPATIENT)
Dept: INTERNAL MEDICINE CLINIC | Facility: CLINIC | Age: 32
End: 2022-05-04

## 2022-09-17 ENCOUNTER — HOSPITAL ENCOUNTER (OUTPATIENT)
Dept: MAMMOGRAPHY | Facility: HOSPITAL | Age: 32
Discharge: HOME | End: 2022-09-17
Attending: OBSTETRICS & GYNECOLOGY

## 2022-09-17 DIAGNOSIS — Z12.31 ENCOUNTER FOR SCREENING MAMMOGRAM FOR MALIGNANT NEOPLASM OF BREAST: ICD-10-CM

## 2022-09-17 PROCEDURE — 77067 SCR MAMMO BI INCL CAD: CPT | Performed by: OBSTETRICS & GYNECOLOGY

## 2022-09-17 PROCEDURE — 77063 BREAST TOMOSYNTHESIS BI: CPT | Performed by: OBSTETRICS & GYNECOLOGY

## 2022-09-29 ENCOUNTER — TELEPHONE (OUTPATIENT)
Dept: INTERNAL MEDICINE CLINIC | Facility: CLINIC | Age: 32
End: 2022-09-29

## 2022-09-29 NOTE — TELEPHONE ENCOUNTER
Pt stated she has sore throat and congestion. Per pt her  has sinus infection and she feels like she might have one as well. Pt stated did home covid test on 9/28 and was negative. Per pt she did throw out the test but will retest at home on 9/29. Pt is aware to keep negative results. Pt scheduled appt for 10/3 and wants to know if appt can be kept in office. Pt also stated that if she started to feel worse she will cancel appt and go to .      Future Appointments   Date Time Provider Lizandro Lind   10/3/2022  3:30 PM Danya Lin MD EMG 8 [facility] [place]   10/18/2022  3:00 PM [address] [facility] [address] [facility] [phone]

## 2022-10-03 ENCOUNTER — TELEMEDICINE (OUTPATIENT)
Dept: INTERNAL MEDICINE CLINIC | Facility: CLINIC | Age: 32
End: 2022-10-03
Payer: COMMERCIAL

## 2022-10-03 DIAGNOSIS — J01.90 ACUTE NON-RECURRENT SINUSITIS, UNSPECIFIED LOCATION: Primary | ICD-10-CM

## 2022-10-03 RX ORDER — AMOXICILLIN 875 MG/1
875 TABLET, COATED ORAL 2 TIMES DAILY
Qty: 20 TABLET | Refills: 0 | Status: SHIPPED | OUTPATIENT
Start: 2022-10-03

## 2022-11-18 ENCOUNTER — HOSPITAL ENCOUNTER (OUTPATIENT)
Dept: MAMMOGRAPHY | Facility: HOSPITAL | Age: 32
Discharge: HOME | End: 2022-11-18
Attending: OBSTETRICS & GYNECOLOGY
Payer: COMMERCIAL

## 2022-11-18 DIAGNOSIS — R92.2 INCONCLUSIVE MAMMOGRAM: ICD-10-CM

## 2022-11-18 PROCEDURE — 76642 ULTRASOUND BREAST LIMITED: CPT | Performed by: OBSTETRICS & GYNECOLOGY

## 2022-11-18 PROCEDURE — 77061 BREAST TOMOSYNTHESIS UNI: CPT | Performed by: OBSTETRICS & GYNECOLOGY

## 2022-11-18 PROCEDURE — 77065 DX MAMMO INCL CAD UNI: CPT | Performed by: OBSTETRICS & GYNECOLOGY

## 2022-11-25 ENCOUNTER — LAB ENCOUNTER (OUTPATIENT)
Dept: LAB | Age: 32
End: 2022-11-25
Attending: FAMILY MEDICINE
Payer: COMMERCIAL

## 2022-11-25 ENCOUNTER — OFFICE VISIT (OUTPATIENT)
Dept: INTERNAL MEDICINE CLINIC | Facility: CLINIC | Age: 32
End: 2022-11-25
Payer: COMMERCIAL

## 2022-11-25 VITALS
TEMPERATURE: 98 F | HEIGHT: 66 IN | BODY MASS INDEX: 31.44 KG/M2 | SYSTOLIC BLOOD PRESSURE: 110 MMHG | WEIGHT: 195.63 LBS | HEART RATE: 78 BPM | OXYGEN SATURATION: 98 % | DIASTOLIC BLOOD PRESSURE: 80 MMHG

## 2022-11-25 DIAGNOSIS — E66.9 OBESITY (BMI 30-39.9): Primary | ICD-10-CM

## 2022-11-25 PROCEDURE — 3074F SYST BP LT 130 MM HG: CPT | Performed by: FAMILY MEDICINE

## 2022-11-25 PROCEDURE — 3008F BODY MASS INDEX DOCD: CPT | Performed by: FAMILY MEDICINE

## 2022-11-25 PROCEDURE — 3079F DIAST BP 80-89 MM HG: CPT | Performed by: FAMILY MEDICINE

## 2022-11-25 PROCEDURE — 36415 COLL VENOUS BLD VENIPUNCTURE: CPT | Performed by: FAMILY MEDICINE

## 2022-11-25 PROCEDURE — 99213 OFFICE O/P EST LOW 20 MIN: CPT | Performed by: FAMILY MEDICINE

## 2022-11-25 PROCEDURE — 84443 ASSAY THYROID STIM HORMONE: CPT | Performed by: FAMILY MEDICINE

## 2022-11-25 RX ORDER — PHENTERMINE HYDROCHLORIDE 37.5 MG/1
37.5 TABLET ORAL
Qty: 30 TABLET | Refills: 0 | Status: SHIPPED | OUTPATIENT
Start: 2022-11-25

## 2022-11-30 NOTE — PROGRESS NOTES
[facility]  Post-Partum Progress Note    Liz Gleason Patient Status:  Inpatient    5/3/1990 MRN [id number]   [facility] 2SW-J Attending Mihir Delvalle MD   Hosp Day # 1 PCP Stacey Yun MD     SUBJECTIVE:    Postpartum Peripheral IV  Performed by: Campos Luna MD  Authorized by: Campos Luna MD     Size:  16 G  Laterality:  Right  Location:  Wrist  Local Anesthetic:  None  Site Prep:  Alcohol  Technique:  Anatomical landmarks: imaged not saved  Attempts:  1  Securement: Tape and Transparent dressing  Performed By:  Anesthesiologist  Anesthesiologist:  Campos Luna MD  Start Time: 11/30/2022 2:10 PM

## 2022-12-19 ENCOUNTER — OFFICE VISIT (OUTPATIENT)
Dept: INTERNAL MEDICINE CLINIC | Facility: CLINIC | Age: 32
End: 2022-12-19
Payer: COMMERCIAL

## 2022-12-19 VITALS
WEIGHT: 188.19 LBS | BODY MASS INDEX: 30.24 KG/M2 | TEMPERATURE: 98 F | SYSTOLIC BLOOD PRESSURE: 110 MMHG | OXYGEN SATURATION: 98 % | HEART RATE: 93 BPM | HEIGHT: 66 IN | DIASTOLIC BLOOD PRESSURE: 70 MMHG

## 2022-12-19 DIAGNOSIS — E66.9 OBESITY (BMI 30-39.9): Primary | ICD-10-CM

## 2022-12-19 DIAGNOSIS — Z79.899 MEDICATION MANAGEMENT: ICD-10-CM

## 2022-12-19 PROCEDURE — 3078F DIAST BP <80 MM HG: CPT | Performed by: FAMILY MEDICINE

## 2022-12-19 PROCEDURE — 3008F BODY MASS INDEX DOCD: CPT | Performed by: FAMILY MEDICINE

## 2022-12-19 PROCEDURE — 99213 OFFICE O/P EST LOW 20 MIN: CPT | Performed by: FAMILY MEDICINE

## 2022-12-19 PROCEDURE — 3074F SYST BP LT 130 MM HG: CPT | Performed by: FAMILY MEDICINE

## 2022-12-19 RX ORDER — PHENTERMINE HYDROCHLORIDE 37.5 MG/1
37.5 TABLET ORAL
Qty: 30 TABLET | Refills: 0 | Status: SHIPPED | OUTPATIENT
Start: 2022-12-19

## 2023-01-16 ENCOUNTER — TELEMEDICINE (OUTPATIENT)
Dept: INTERNAL MEDICINE CLINIC | Facility: CLINIC | Age: 33
End: 2023-01-16
Payer: COMMERCIAL

## 2023-01-16 DIAGNOSIS — U07.1 COVID-19 VIRUS INFECTION: Primary | ICD-10-CM

## 2023-01-16 PROCEDURE — 99213 OFFICE O/P EST LOW 20 MIN: CPT | Performed by: FAMILY MEDICINE

## 2023-01-23 ENCOUNTER — OFFICE VISIT (OUTPATIENT)
Dept: INTERNAL MEDICINE CLINIC | Facility: CLINIC | Age: 33
End: 2023-01-23
Payer: COMMERCIAL

## 2023-01-23 VITALS
HEART RATE: 88 BPM | OXYGEN SATURATION: 98 % | WEIGHT: 183.31 LBS | BODY MASS INDEX: 29.46 KG/M2 | SYSTOLIC BLOOD PRESSURE: 110 MMHG | DIASTOLIC BLOOD PRESSURE: 80 MMHG | HEIGHT: 66 IN | TEMPERATURE: 99 F

## 2023-01-23 DIAGNOSIS — M25.511 CHRONIC RIGHT SHOULDER PAIN: ICD-10-CM

## 2023-01-23 DIAGNOSIS — Z79.899 MEDICATION MANAGEMENT: ICD-10-CM

## 2023-01-23 DIAGNOSIS — E66.9 OBESITY (BMI 30-39.9): Primary | ICD-10-CM

## 2023-01-23 DIAGNOSIS — G89.29 CHRONIC RIGHT SHOULDER PAIN: ICD-10-CM

## 2023-01-23 PROCEDURE — 3079F DIAST BP 80-89 MM HG: CPT | Performed by: FAMILY MEDICINE

## 2023-01-23 PROCEDURE — 3074F SYST BP LT 130 MM HG: CPT | Performed by: FAMILY MEDICINE

## 2023-01-23 PROCEDURE — 3008F BODY MASS INDEX DOCD: CPT | Performed by: FAMILY MEDICINE

## 2023-01-23 PROCEDURE — 99213 OFFICE O/P EST LOW 20 MIN: CPT | Performed by: FAMILY MEDICINE

## 2023-01-23 RX ORDER — PHENTERMINE HYDROCHLORIDE 37.5 MG/1
37.5 TABLET ORAL
Qty: 30 TABLET | Refills: 0 | Status: SHIPPED | OUTPATIENT
Start: 2023-01-23

## 2023-01-24 ENCOUNTER — TELEPHONE (OUTPATIENT)
Dept: ORTHOPEDICS CLINIC | Facility: CLINIC | Age: 33
End: 2023-01-24

## 2023-01-24 DIAGNOSIS — M25.511 RIGHT SHOULDER PAIN, UNSPECIFIED CHRONICITY: Primary | ICD-10-CM

## 2023-01-24 NOTE — TELEPHONE ENCOUNTER
Patient is coming in  For right shoulder currently there is are no  imagining on file. Please create order for xray and I will notify patient to arrive  20 minutes prior to appointment time for imaging.     Thank you,

## 2023-01-29 ENCOUNTER — HOSPITAL ENCOUNTER (OUTPATIENT)
Dept: GENERAL RADIOLOGY | Age: 33
Discharge: HOME | End: 2023-01-29
Attending: ORTHOPAEDIC SURGERY
Payer: COMMERCIAL

## 2023-01-29 ENCOUNTER — HOSPITAL ENCOUNTER (OUTPATIENT)
Dept: GENERAL RADIOLOGY | Age: 33
End: 2023-01-29
Attending: ORTHOPAEDIC SURGERY
Payer: COMMERCIAL

## 2023-01-29 DIAGNOSIS — M25.511 RIGHT SHOULDER PAIN, UNSPECIFIED CHRONICITY: ICD-10-CM

## 2023-01-29 PROCEDURE — 73030 X-RAY EXAM OF SHOULDER: CPT | Performed by: ORTHOPAEDIC SURGERY

## 2023-02-13 ENCOUNTER — OFFICE VISIT (OUTPATIENT)
Dept: ORTHOPEDICS CLINIC | Facility: CLINIC | Age: 33
End: 2023-02-13
Payer: COMMERCIAL

## 2023-02-13 VITALS — BODY MASS INDEX: 28.93 KG/M2 | HEIGHT: 66 IN | WEIGHT: 180 LBS

## 2023-02-13 DIAGNOSIS — M75.41 IMPINGEMENT SYNDROME OF RIGHT SHOULDER: Primary | ICD-10-CM

## 2023-02-13 DIAGNOSIS — M75.81 TENDINITIS OF RIGHT ROTATOR CUFF: ICD-10-CM

## 2023-02-20 ENCOUNTER — OFFICE VISIT (OUTPATIENT)
Dept: INTERNAL MEDICINE CLINIC | Facility: CLINIC | Age: 33
End: 2023-02-20
Payer: COMMERCIAL

## 2023-02-20 VITALS
WEIGHT: 183.19 LBS | OXYGEN SATURATION: 99 % | BODY MASS INDEX: 29.44 KG/M2 | HEIGHT: 66 IN | DIASTOLIC BLOOD PRESSURE: 60 MMHG | HEART RATE: 90 BPM | TEMPERATURE: 98 F | SYSTOLIC BLOOD PRESSURE: 120 MMHG

## 2023-02-20 DIAGNOSIS — Z79.899 MEDICATION MANAGEMENT: ICD-10-CM

## 2023-02-20 DIAGNOSIS — J30.2 SEASONAL ALLERGIC RHINITIS, UNSPECIFIED TRIGGER: ICD-10-CM

## 2023-02-20 DIAGNOSIS — E66.9 OBESITY (BMI 30-39.9): Primary | ICD-10-CM

## 2023-02-20 PROCEDURE — 3008F BODY MASS INDEX DOCD: CPT | Performed by: FAMILY MEDICINE

## 2023-02-20 PROCEDURE — 99213 OFFICE O/P EST LOW 20 MIN: CPT | Performed by: FAMILY MEDICINE

## 2023-02-20 PROCEDURE — 3074F SYST BP LT 130 MM HG: CPT | Performed by: FAMILY MEDICINE

## 2023-02-20 PROCEDURE — 3078F DIAST BP <80 MM HG: CPT | Performed by: FAMILY MEDICINE

## 2023-02-20 RX ORDER — PHENTERMINE HYDROCHLORIDE 37.5 MG/1
37.5 TABLET ORAL
Qty: 30 TABLET | Refills: 0 | Status: SHIPPED | OUTPATIENT
Start: 2023-02-20

## 2023-02-28 ENCOUNTER — OFFICE VISIT (OUTPATIENT)
Dept: INTERNAL MEDICINE CLINIC | Facility: CLINIC | Age: 33
End: 2023-02-28
Payer: COMMERCIAL

## 2023-02-28 VITALS
BODY MASS INDEX: 29.41 KG/M2 | HEIGHT: 66 IN | HEART RATE: 100 BPM | OXYGEN SATURATION: 97 % | RESPIRATION RATE: 12 BRPM | WEIGHT: 183 LBS | SYSTOLIC BLOOD PRESSURE: 118 MMHG | DIASTOLIC BLOOD PRESSURE: 76 MMHG | TEMPERATURE: 98 F

## 2023-02-28 DIAGNOSIS — J02.9 PHARYNGITIS, UNSPECIFIED ETIOLOGY: Primary | ICD-10-CM

## 2023-02-28 LAB
CONTROL LINE PRESENT WITH A CLEAR BACKGROUND (YES/NO): YES YES/NO
KIT LOT #: 4010 NUMERIC
STREP GRP A CUL-SCR: NEGATIVE

## 2023-02-28 PROCEDURE — 3078F DIAST BP <80 MM HG: CPT | Performed by: FAMILY MEDICINE

## 2023-02-28 PROCEDURE — 87880 STREP A ASSAY W/OPTIC: CPT | Performed by: FAMILY MEDICINE

## 2023-02-28 PROCEDURE — 3074F SYST BP LT 130 MM HG: CPT | Performed by: FAMILY MEDICINE

## 2023-02-28 PROCEDURE — 3008F BODY MASS INDEX DOCD: CPT | Performed by: FAMILY MEDICINE

## 2023-02-28 PROCEDURE — 99213 OFFICE O/P EST LOW 20 MIN: CPT | Performed by: FAMILY MEDICINE

## 2023-03-20 ENCOUNTER — OFFICE VISIT (OUTPATIENT)
Dept: INTERNAL MEDICINE CLINIC | Facility: CLINIC | Age: 33
End: 2023-03-20
Payer: COMMERCIAL

## 2023-03-20 VITALS
BODY MASS INDEX: 28.61 KG/M2 | WEIGHT: 178 LBS | OXYGEN SATURATION: 99 % | HEART RATE: 76 BPM | SYSTOLIC BLOOD PRESSURE: 102 MMHG | DIASTOLIC BLOOD PRESSURE: 80 MMHG | HEIGHT: 66 IN | TEMPERATURE: 98 F

## 2023-03-20 DIAGNOSIS — E66.9 OBESITY (BMI 30-39.9): Primary | ICD-10-CM

## 2023-03-20 DIAGNOSIS — Z79.899 MEDICATION MANAGEMENT: ICD-10-CM

## 2023-03-20 PROCEDURE — 3079F DIAST BP 80-89 MM HG: CPT | Performed by: FAMILY MEDICINE

## 2023-03-20 PROCEDURE — 3074F SYST BP LT 130 MM HG: CPT | Performed by: FAMILY MEDICINE

## 2023-03-20 PROCEDURE — 99213 OFFICE O/P EST LOW 20 MIN: CPT | Performed by: FAMILY MEDICINE

## 2023-03-20 PROCEDURE — 3008F BODY MASS INDEX DOCD: CPT | Performed by: FAMILY MEDICINE

## 2023-03-20 RX ORDER — PHENTERMINE HYDROCHLORIDE 37.5 MG/1
37.5 TABLET ORAL
Qty: 30 TABLET | Refills: 0 | Status: SHIPPED | OUTPATIENT
Start: 2023-03-20

## 2023-04-15 ENCOUNTER — LAB ENCOUNTER (OUTPATIENT)
Dept: LAB | Age: 33
End: 2023-04-15
Attending: FAMILY MEDICINE
Payer: COMMERCIAL

## 2023-04-15 ENCOUNTER — OFFICE VISIT (OUTPATIENT)
Dept: INTERNAL MEDICINE CLINIC | Facility: CLINIC | Age: 33
End: 2023-04-15
Payer: COMMERCIAL

## 2023-04-15 VITALS
WEIGHT: 173.38 LBS | DIASTOLIC BLOOD PRESSURE: 60 MMHG | HEIGHT: 65 IN | RESPIRATION RATE: 16 BRPM | TEMPERATURE: 99 F | OXYGEN SATURATION: 99 % | HEART RATE: 96 BPM | SYSTOLIC BLOOD PRESSURE: 100 MMHG | BODY MASS INDEX: 28.89 KG/M2

## 2023-04-15 DIAGNOSIS — Z00.00 WELLNESS EXAMINATION: Primary | ICD-10-CM

## 2023-04-15 DIAGNOSIS — R41.840 ATTENTION DEFICIT: ICD-10-CM

## 2023-04-15 DIAGNOSIS — Z00.00 LABORATORY EXAM ORDERED AS PART OF ROUTINE GENERAL MEDICAL EXAMINATION: ICD-10-CM

## 2023-04-15 PROBLEM — O09.30 LATE PRENATAL CARE (HCC): Status: RESOLVED | Noted: 2020-06-02 | Resolved: 2023-04-15

## 2023-04-15 PROBLEM — R82.71 GBS BACTERIURIA: Status: RESOLVED | Noted: 2020-06-22 | Resolved: 2023-04-15

## 2023-04-15 PROBLEM — N12 PYELONEPHRITIS: Status: RESOLVED | Noted: 2017-01-09 | Resolved: 2023-04-15

## 2023-04-15 PROBLEM — O09.30 LATE PRENATAL CARE: Status: RESOLVED | Noted: 2020-06-02 | Resolved: 2023-04-15

## 2023-04-15 PROBLEM — R42 DIZZINESS: Status: RESOLVED | Noted: 2019-06-19 | Resolved: 2023-04-15

## 2023-04-15 LAB
ALBUMIN SERPL-MCNC: 3.4 G/DL (ref 3.4–5)
ALBUMIN/GLOB SERPL: 1.1 {RATIO} (ref 1–2)
ALP LIVER SERPL-CCNC: 28 U/L
ALT SERPL-CCNC: 25 U/L
ANION GAP SERPL CALC-SCNC: 0 MMOL/L (ref 0–18)
AST SERPL-CCNC: 12 U/L (ref 15–37)
BASOPHILS # BLD AUTO: 0.02 X10(3) UL (ref 0–0.2)
BASOPHILS NFR BLD AUTO: 0.3 %
BILIRUB SERPL-MCNC: 0.3 MG/DL (ref 0.1–2)
BILIRUB UR QL STRIP.AUTO: NEGATIVE
BUN BLD-MCNC: 8 MG/DL (ref 7–18)
CALCIUM BLD-MCNC: 8.8 MG/DL (ref 8.5–10.1)
CHLORIDE SERPL-SCNC: 110 MMOL/L (ref 98–112)
CHOLEST SERPL-MCNC: 170 MG/DL (ref ?–200)
CLARITY UR REFRACT.AUTO: CLEAR
CO2 SERPL-SCNC: 27 MMOL/L (ref 21–32)
COLOR UR AUTO: YELLOW
CREAT BLD-MCNC: 0.68 MG/DL
EOSINOPHIL # BLD AUTO: 0.04 X10(3) UL (ref 0–0.7)
EOSINOPHIL NFR BLD AUTO: 0.5 %
ERYTHROCYTE [DISTWIDTH] IN BLOOD BY AUTOMATED COUNT: 12 %
FASTING PATIENT LIPID ANSWER: YES
FASTING STATUS PATIENT QL REPORTED: YES
GFR SERPLBLD BASED ON 1.73 SQ M-ARVRAT: 119 ML/MIN/1.73M2 (ref 60–?)
GLOBULIN PLAS-MCNC: 3 G/DL (ref 2.8–4.4)
GLUCOSE BLD-MCNC: 80 MG/DL (ref 70–99)
GLUCOSE UR STRIP.AUTO-MCNC: NEGATIVE MG/DL
HCT VFR BLD AUTO: 39.5 %
HDLC SERPL-MCNC: 47 MG/DL (ref 40–59)
HGB BLD-MCNC: 13.3 G/DL
IMM GRANULOCYTES # BLD AUTO: 0.01 X10(3) UL (ref 0–1)
IMM GRANULOCYTES NFR BLD: 0.1 %
KETONES UR STRIP.AUTO-MCNC: NEGATIVE MG/DL
LDLC SERPL CALC-MCNC: 106 MG/DL (ref ?–100)
LEUKOCYTE ESTERASE UR QL STRIP.AUTO: NEGATIVE
LYMPHOCYTES # BLD AUTO: 2 X10(3) UL (ref 1–4)
LYMPHOCYTES NFR BLD AUTO: 27.3 %
MCH RBC QN AUTO: 29.8 PG (ref 26–34)
MCHC RBC AUTO-ENTMCNC: 33.7 G/DL (ref 31–37)
MCV RBC AUTO: 88.4 FL
MONOCYTES # BLD AUTO: 0.42 X10(3) UL (ref 0.1–1)
MONOCYTES NFR BLD AUTO: 5.7 %
NEUTROPHILS # BLD AUTO: 4.84 X10 (3) UL (ref 1.5–7.7)
NEUTROPHILS # BLD AUTO: 4.84 X10(3) UL (ref 1.5–7.7)
NEUTROPHILS NFR BLD AUTO: 66.1 %
NITRITE UR QL STRIP.AUTO: NEGATIVE
NONHDLC SERPL-MCNC: 123 MG/DL (ref ?–130)
OSMOLALITY SERPL CALC.SUM OF ELEC: 281 MOSM/KG (ref 275–295)
PH UR STRIP.AUTO: 7 [PH] (ref 5–8)
PLATELET # BLD AUTO: 189 10(3)UL (ref 150–450)
POTASSIUM SERPL-SCNC: 4.1 MMOL/L (ref 3.5–5.1)
PROT SERPL-MCNC: 6.4 G/DL (ref 6.4–8.2)
PROT UR STRIP.AUTO-MCNC: NEGATIVE MG/DL
RBC # BLD AUTO: 4.47 X10(6)UL
SODIUM SERPL-SCNC: 137 MMOL/L (ref 136–145)
SP GR UR STRIP.AUTO: 1.02 (ref 1–1.03)
TRIGL SERPL-MCNC: 94 MG/DL (ref 30–149)
TSI SER-ACNC: 2.08 MIU/ML (ref 0.36–3.74)
UROBILINOGEN UR STRIP.AUTO-MCNC: <2 MG/DL
VIT D+METAB SERPL-MCNC: 36 NG/ML (ref 30–100)
VLDLC SERPL CALC-MCNC: 16 MG/DL (ref 0–30)
WBC # BLD AUTO: 7.3 X10(3) UL (ref 4–11)

## 2023-04-15 PROCEDURE — 3078F DIAST BP <80 MM HG: CPT | Performed by: FAMILY MEDICINE

## 2023-04-15 PROCEDURE — 3008F BODY MASS INDEX DOCD: CPT | Performed by: FAMILY MEDICINE

## 2023-04-15 PROCEDURE — 3074F SYST BP LT 130 MM HG: CPT | Performed by: FAMILY MEDICINE

## 2023-04-15 PROCEDURE — 84443 ASSAY THYROID STIM HORMONE: CPT | Performed by: FAMILY MEDICINE

## 2023-04-15 PROCEDURE — 80061 LIPID PANEL: CPT | Performed by: FAMILY MEDICINE

## 2023-04-15 PROCEDURE — 82306 VITAMIN D 25 HYDROXY: CPT | Performed by: FAMILY MEDICINE

## 2023-04-15 PROCEDURE — 80053 COMPREHEN METABOLIC PANEL: CPT | Performed by: FAMILY MEDICINE

## 2023-04-15 PROCEDURE — 81001 URINALYSIS AUTO W/SCOPE: CPT | Performed by: FAMILY MEDICINE

## 2023-04-15 PROCEDURE — 36415 COLL VENOUS BLD VENIPUNCTURE: CPT | Performed by: FAMILY MEDICINE

## 2023-04-15 PROCEDURE — 99395 PREV VISIT EST AGE 18-39: CPT | Performed by: FAMILY MEDICINE

## 2023-04-15 PROCEDURE — 85025 COMPLETE CBC W/AUTO DIFF WBC: CPT | Performed by: FAMILY MEDICINE

## 2023-04-15 RX ORDER — PHENTERMINE HYDROCHLORIDE 37.5 MG/1
37.5 TABLET ORAL
Qty: 30 TABLET | Refills: 0 | Status: SHIPPED | OUTPATIENT
Start: 2023-04-15

## 2023-05-18 ENCOUNTER — OFFICE VISIT (OUTPATIENT)
Dept: INTERNAL MEDICINE CLINIC | Facility: CLINIC | Age: 33
End: 2023-05-18
Payer: COMMERCIAL

## 2023-05-18 VITALS
BODY MASS INDEX: 30.05 KG/M2 | HEART RATE: 93 BPM | OXYGEN SATURATION: 98 % | DIASTOLIC BLOOD PRESSURE: 60 MMHG | SYSTOLIC BLOOD PRESSURE: 104 MMHG | WEIGHT: 180.38 LBS | HEIGHT: 65 IN | TEMPERATURE: 98 F

## 2023-05-18 DIAGNOSIS — E66.9 OBESITY (BMI 30-39.9): Primary | ICD-10-CM

## 2023-05-18 DIAGNOSIS — Z79.899 MEDICATION MANAGEMENT: ICD-10-CM

## 2023-05-18 PROCEDURE — 3074F SYST BP LT 130 MM HG: CPT | Performed by: FAMILY MEDICINE

## 2023-05-18 PROCEDURE — 3008F BODY MASS INDEX DOCD: CPT | Performed by: FAMILY MEDICINE

## 2023-05-18 PROCEDURE — 99213 OFFICE O/P EST LOW 20 MIN: CPT | Performed by: FAMILY MEDICINE

## 2023-05-18 PROCEDURE — 3078F DIAST BP <80 MM HG: CPT | Performed by: FAMILY MEDICINE

## 2023-05-18 RX ORDER — NORETHINDRONE ACETATE AND ETHINYL ESTRADIOL AND FERROUS FUMARATE 1MG-20(21)
1 KIT ORAL DAILY
Qty: 84 TABLET | Refills: 1 | Status: SHIPPED | OUTPATIENT
Start: 2023-05-18 | End: 2024-05-17

## 2023-05-18 RX ORDER — PHENTERMINE HYDROCHLORIDE 37.5 MG/1
37.5 TABLET ORAL
Qty: 30 TABLET | Refills: 0 | Status: SHIPPED | OUTPATIENT
Start: 2023-05-18

## 2023-06-05 LAB
ANTIBODY SCREEN OB: NEGATIVE
HEPATITIS B SURFACE ANTIGEN OB: NEGATIVE
HIV RESULT OB: NEGATIVE
RAPID PLASMA REAGIN OB: NONREACTIVE

## 2023-08-11 ENCOUNTER — OFFICE VISIT (OUTPATIENT)
Dept: PERINATAL CARE | Facility: HOSPITAL | Age: 33
End: 2023-08-11
Attending: OBSTETRICS & GYNECOLOGY
Payer: MEDICAID

## 2023-08-11 VITALS
HEIGHT: 65 IN | WEIGHT: 200 LBS | BODY MASS INDEX: 33.32 KG/M2 | DIASTOLIC BLOOD PRESSURE: 75 MMHG | SYSTOLIC BLOOD PRESSURE: 115 MMHG | HEART RATE: 90 BPM

## 2023-08-11 DIAGNOSIS — O99.212 OTHER OBESITY AFFECTING PREGNANCY IN SECOND TRIMESTER: ICD-10-CM

## 2023-08-11 DIAGNOSIS — E66.8 OTHER OBESITY AFFECTING PREGNANCY IN SECOND TRIMESTER: ICD-10-CM

## 2023-08-11 DIAGNOSIS — O09.891 MEDICATION EXPOSURE DURING FIRST TRIMESTER OF PREGNANCY: Primary | ICD-10-CM

## 2023-08-11 DIAGNOSIS — O09.891 MEDICATION EXPOSURE DURING FIRST TRIMESTER OF PREGNANCY: ICD-10-CM

## 2023-08-11 PROBLEM — O99.210 OBESITY COMPLICATING PREGNANCY (HCC): Status: ACTIVE | Noted: 2020-10-12

## 2023-08-11 PROBLEM — O99.210 OBESITY COMPLICATING PREGNANCY: Status: ACTIVE | Noted: 2020-10-12

## 2023-08-11 PROCEDURE — 76811 OB US DETAILED SNGL FETUS: CPT | Performed by: OBSTETRICS & GYNECOLOGY

## 2023-08-11 NOTE — PROGRESS NOTES
Pt here for level 2 US. +FM, pt states she has been having some lower RQ pain that sounds like round ligament. Otherwise no obstetric complaints.

## 2023-09-17 ENCOUNTER — HOSPITAL ENCOUNTER (OUTPATIENT)
Facility: HOSPITAL | Age: 33
Discharge: HOME | End: 2023-09-18
Attending: OBSTETRICS & GYNECOLOGY | Admitting: OBSTETRICS & GYNECOLOGY
Payer: COMMERCIAL

## 2023-09-17 RX ORDER — CHOLECALCIFEROL (VITAMIN D3) 25 MCG
1 TABLET,CHEWABLE ORAL DAILY
COMMUNITY

## 2023-09-18 VITALS
DIASTOLIC BLOOD PRESSURE: 71 MMHG | HEIGHT: 65 IN | WEIGHT: 200 LBS | BODY MASS INDEX: 33.32 KG/M2 | HEART RATE: 87 BPM | RESPIRATION RATE: 16 BRPM | SYSTOLIC BLOOD PRESSURE: 107 MMHG | TEMPERATURE: 98 F

## 2023-09-18 PROCEDURE — 99213 OFFICE O/P EST LOW 20 MIN: CPT

## 2023-09-18 NOTE — NST
Nonstress Test   Patient: Dylan Jacklyn    Gestation: 25w3d    NST:       Variability: Moderate           Accelerations: Yes           Decelerations: Variable (carrot variable x1)            Baseline: 140 BPM           Uterine Irritability: Yes           Contractions: Not present                                        Acoustic Stimulator: No           Nonstress Test Interpretation: Appropriate for gestational age           Nonstress Test Second Interpretation: Appropriate for gestational age                     Additional Comments

## 2023-09-18 NOTE — PROGRESS NOTES
Pt is a 29year old female admitted to TRG5/TRG5-A. Patient presents with:  Decreased Fetal Movement: Pt felt last movement at 7pm and states the baby usually is more active at night when laying down. Pt ate some elle kisses and waited 20 min and no movement. Pt denies lof, bleeding, ctx     Pt is  25w3d intra-uterine pregnancy. History obtained, consents signed. Oriented to room, staff, and plan of care.

## 2023-09-18 NOTE — PROGRESS NOTES
Discharged to home per ambulatory in stable condition with written and verbal instructions. Patient verbalizes understanding of information given. Low Risk (score 7-11)

## 2023-10-22 LAB — HIV RESULT OB: NEGATIVE

## 2023-11-06 ENCOUNTER — ULTRASOUND ENCOUNTER (OUTPATIENT)
Dept: PERINATAL CARE | Facility: HOSPITAL | Age: 33
End: 2023-11-06
Attending: OBSTETRICS & GYNECOLOGY
Payer: COMMERCIAL

## 2023-11-06 VITALS
WEIGHT: 214 LBS | DIASTOLIC BLOOD PRESSURE: 66 MMHG | BODY MASS INDEX: 35.65 KG/M2 | HEIGHT: 65 IN | HEART RATE: 94 BPM | SYSTOLIC BLOOD PRESSURE: 113 MMHG

## 2023-11-06 DIAGNOSIS — O09.891 MEDICATION EXPOSURE DURING FIRST TRIMESTER OF PREGNANCY: ICD-10-CM

## 2023-11-06 DIAGNOSIS — E66.09 OTHER OBESITY DUE TO EXCESS CALORIES AFFECTING PREGNANCY IN THIRD TRIMESTER: ICD-10-CM

## 2023-11-06 DIAGNOSIS — O99.212 OTHER OBESITY AFFECTING PREGNANCY IN SECOND TRIMESTER: ICD-10-CM

## 2023-11-06 DIAGNOSIS — E66.8 OTHER OBESITY AFFECTING PREGNANCY IN SECOND TRIMESTER: ICD-10-CM

## 2023-11-06 DIAGNOSIS — O99.210 OBESITY AFFECTING PREGNANCY: ICD-10-CM

## 2023-11-06 DIAGNOSIS — O99.210 OBESITY AFFECTING PREGNANCY: Primary | ICD-10-CM

## 2023-11-06 DIAGNOSIS — O99.213 OTHER OBESITY DUE TO EXCESS CALORIES AFFECTING PREGNANCY IN THIRD TRIMESTER: ICD-10-CM

## 2023-11-06 PROCEDURE — 76816 OB US FOLLOW-UP PER FETUS: CPT | Performed by: OBSTETRICS & GYNECOLOGY

## 2023-11-06 PROCEDURE — 76819 FETAL BIOPHYS PROFIL W/O NST: CPT

## 2023-12-01 LAB — STREP GP B CULT OB: NEGATIVE

## 2023-12-06 ENCOUNTER — HOSPITAL ENCOUNTER (OUTPATIENT)
Facility: HOSPITAL | Age: 33
Discharge: HOME | End: 2023-12-06
Attending: OBSTETRICS & GYNECOLOGY | Admitting: OBSTETRICS & GYNECOLOGY
Payer: COMMERCIAL

## 2023-12-06 VITALS
WEIGHT: 215 LBS | BODY MASS INDEX: 35.82 KG/M2 | RESPIRATION RATE: 15 BRPM | SYSTOLIC BLOOD PRESSURE: 106 MMHG | DIASTOLIC BLOOD PRESSURE: 78 MMHG | HEART RATE: 92 BPM | HEIGHT: 65 IN

## 2023-12-06 PROCEDURE — 99213 OFFICE O/P EST LOW 20 MIN: CPT

## 2023-12-06 PROCEDURE — 59025 FETAL NON-STRESS TEST: CPT

## 2023-12-06 NOTE — PROGRESS NOTES
Written and verbal discharge instructions given. Questions answered. Labor precautions reviewed. Pt verbalized understanding. Pt ambulated off unit alert and stable.

## 2023-12-06 NOTE — DISCHARGE INSTRUCTIONS
Discharge Instructions    Diet: Regular  Activity: Normal activity         General Instructions    Call your [facility] doctor if: Fluid leaking from your vagina; Decrease in fetal movement;Vaginal or rectal pressure;Uterine contractions 10 minutes or closer for 1 to 2 hours;Vaginal bleeding;Uterine contractions increasing in intensity and frequency; Temperature greater than 100F  Early labor comfort measures: Drink fluids and eat small light meals; Take a walk;Relax, sleep, take a warm bath or shower for 30 minutes or less

## 2023-12-06 NOTE — NST
Nonstress Test   Patient: Kelvincella Expose    Gestation: 36w5d    NST:       Variability: Moderate           Accelerations: Yes           Decelerations: Variable            Baseline: 135 BPM           Uterine Irritability: No           Contractions: Regular                    Contraction Frequency: 3-4                   Acoustic Stimulator: No           Nonstress Test Interpretation: Reactive           Nonstress Test Second Interpretation: Reactive                     Additional Comments

## 2023-12-06 NOTE — PROGRESS NOTES
admitted to triage 3. Ambulated to room, to bathroom, gown on, pt to bed. Pt states ctxs began around 0100, denies lof or bleeding. Efm and toco applied. Initial assessment done.

## 2023-12-27 ENCOUNTER — HOSPITAL ENCOUNTER (INPATIENT)
Facility: HOSPITAL | Age: 33
LOS: 3 days | Discharge: HOME | End: 2023-12-30
Attending: OBSTETRICS & GYNECOLOGY | Admitting: OBSTETRICS & GYNECOLOGY
Payer: COMMERCIAL

## 2023-12-27 ENCOUNTER — APPOINTMENT (OUTPATIENT)
Dept: OBGYN CLINIC | Facility: HOSPITAL | Age: 33
End: 2023-12-27
Payer: COMMERCIAL

## 2023-12-27 ENCOUNTER — ANESTHESIA (OUTPATIENT)
Dept: OBGYN UNIT | Facility: HOSPITAL | Age: 33
End: 2023-12-27
Payer: COMMERCIAL

## 2023-12-27 ENCOUNTER — ANESTHESIA EVENT (OUTPATIENT)
Dept: OBGYN UNIT | Facility: HOSPITAL | Age: 33
End: 2023-12-27
Payer: COMMERCIAL

## 2023-12-27 PROBLEM — Z34.90 PREGNANCY (HCC): Status: ACTIVE | Noted: 2023-12-27

## 2023-12-27 PROBLEM — Z34.90 PREGNANCY: Status: ACTIVE | Noted: 2023-12-27

## 2023-12-27 LAB
ANTIBODY SCREEN: NEGATIVE
BASOPHILS # BLD AUTO: 0.01 X10(3) UL (ref 0–0.2)
BASOPHILS # BLD AUTO: 0.02 X10(3) UL (ref 0–0.2)
BASOPHILS # BLD AUTO: 0.02 X10(3) UL (ref 0–0.2)
BASOPHILS NFR BLD AUTO: 0.1 %
BASOPHILS NFR BLD AUTO: 0.2 %
BASOPHILS NFR BLD AUTO: 0.3 %
EOSINOPHIL # BLD AUTO: 0 X10(3) UL (ref 0–0.7)
EOSINOPHIL # BLD AUTO: 0.02 X10(3) UL (ref 0–0.7)
EOSINOPHIL # BLD AUTO: 0.02 X10(3) UL (ref 0–0.7)
EOSINOPHIL NFR BLD AUTO: 0 %
EOSINOPHIL NFR BLD AUTO: 0.2 %
EOSINOPHIL NFR BLD AUTO: 0.3 %
ERYTHROCYTE [DISTWIDTH] IN BLOOD BY AUTOMATED COUNT: 13 %
ERYTHROCYTE [DISTWIDTH] IN BLOOD BY AUTOMATED COUNT: 13.1 %
ERYTHROCYTE [DISTWIDTH] IN BLOOD BY AUTOMATED COUNT: 13.1 %
HCT VFR BLD AUTO: 27 %
HCT VFR BLD AUTO: 28.7 %
HCT VFR BLD AUTO: 33.3 %
HGB BLD-MCNC: 11.6 G/DL
HGB BLD-MCNC: 9.3 G/DL
HGB BLD-MCNC: 9.7 G/DL
IMM GRANULOCYTES # BLD AUTO: 0.04 X10(3) UL (ref 0–1)
IMM GRANULOCYTES # BLD AUTO: 0.07 X10(3) UL (ref 0–1)
IMM GRANULOCYTES # BLD AUTO: 0.09 X10(3) UL (ref 0–1)
IMM GRANULOCYTES NFR BLD: 0.4 %
IMM GRANULOCYTES NFR BLD: 0.8 %
IMM GRANULOCYTES NFR BLD: 1 %
LYMPHOCYTES # BLD AUTO: 0.46 X10(3) UL (ref 1–4)
LYMPHOCYTES # BLD AUTO: 0.52 X10(3) UL (ref 1–4)
LYMPHOCYTES # BLD AUTO: 0.63 X10(3) UL (ref 1–4)
LYMPHOCYTES NFR BLD AUTO: 4.2 %
LYMPHOCYTES NFR BLD AUTO: 5.3 %
LYMPHOCYTES NFR BLD AUTO: 7.2 %
MCH RBC QN AUTO: 30.9 PG (ref 26–34)
MCH RBC QN AUTO: 31.3 PG (ref 26–34)
MCH RBC QN AUTO: 31.5 PG (ref 26–34)
MCHC RBC AUTO-ENTMCNC: 33.8 G/DL (ref 31–37)
MCHC RBC AUTO-ENTMCNC: 34.4 G/DL (ref 31–37)
MCHC RBC AUTO-ENTMCNC: 34.8 G/DL (ref 31–37)
MCV RBC AUTO: 88.6 FL
MCV RBC AUTO: 91.5 FL
MCV RBC AUTO: 92.6 FL
MONOCYTES # BLD AUTO: 0.33 X10(3) UL (ref 0.1–1)
MONOCYTES # BLD AUTO: 0.37 X10(3) UL (ref 0.1–1)
MONOCYTES # BLD AUTO: 0.4 X10(3) UL (ref 0.1–1)
MONOCYTES NFR BLD AUTO: 3.4 %
MONOCYTES NFR BLD AUTO: 3.4 %
MONOCYTES NFR BLD AUTO: 4.6 %
NEUTROPHILS # BLD AUTO: 10.11 X10 (3) UL (ref 1.5–7.7)
NEUTROPHILS # BLD AUTO: 10.11 X10(3) UL (ref 1.5–7.7)
NEUTROPHILS # BLD AUTO: 10.64 X10 (3) UL (ref 1.5–7.7)
NEUTROPHILS # BLD AUTO: 10.64 X10(3) UL (ref 1.5–7.7)
NEUTROPHILS # BLD AUTO: 6.26 X10 (3) UL (ref 1.5–7.7)
NEUTROPHILS # BLD AUTO: 6.26 X10(3) UL (ref 1.5–7.7)
NEUTROPHILS NFR BLD AUTO: 86.6 %
NEUTROPHILS NFR BLD AUTO: 90.1 %
NEUTROPHILS NFR BLD AUTO: 91.9 %
PLATELET # BLD AUTO: 130 10(3)UL (ref 150–450)
PLATELET # BLD AUTO: 135 10(3)UL (ref 150–450)
PLATELET # BLD AUTO: 144 10(3)UL (ref 150–450)
RBC # BLD AUTO: 2.95 X10(6)UL
RBC # BLD AUTO: 3.1 X10(6)UL
RBC # BLD AUTO: 3.76 X10(6)UL
RH BLOOD TYPE: POSITIVE
T PALLIDUM AB SER QL IA: NONREACTIVE
WBC # BLD AUTO: 11 X10(3) UL (ref 4–11)
WBC # BLD AUTO: 11.8 X10(3) UL (ref 4–11)
WBC # BLD AUTO: 7.2 X10(3) UL (ref 4–11)

## 2023-12-27 PROCEDURE — 86901 BLOOD TYPING SEROLOGIC RH(D): CPT | Performed by: OBSTETRICS & GYNECOLOGY

## 2023-12-27 PROCEDURE — 76942 ECHO GUIDE FOR BIOPSY: CPT | Performed by: ANESTHESIOLOGY

## 2023-12-27 PROCEDURE — 86850 RBC ANTIBODY SCREEN: CPT | Performed by: OBSTETRICS & GYNECOLOGY

## 2023-12-27 PROCEDURE — 85025 COMPLETE CBC W/AUTO DIFF WBC: CPT | Performed by: OBSTETRICS & GYNECOLOGY

## 2023-12-27 PROCEDURE — 86780 TREPONEMA PALLIDUM: CPT | Performed by: OBSTETRICS & GYNECOLOGY

## 2023-12-27 PROCEDURE — 88302 TISSUE EXAM BY PATHOLOGIST: CPT | Performed by: OBSTETRICS & GYNECOLOGY

## 2023-12-27 PROCEDURE — 0UB70ZZ EXCISION OF BILATERAL FALLOPIAN TUBES, OPEN APPROACH: ICD-10-PCS | Performed by: OBSTETRICS & GYNECOLOGY

## 2023-12-27 PROCEDURE — 86900 BLOOD TYPING SEROLOGIC ABO: CPT | Performed by: OBSTETRICS & GYNECOLOGY

## 2023-12-27 PROCEDURE — 3E0T3BZ INTRODUCTION OF ANESTHETIC AGENT INTO PERIPHERAL NERVES AND PLEXI, PERCUTANEOUS APPROACH: ICD-10-PCS | Performed by: ANESTHESIOLOGY

## 2023-12-27 PROCEDURE — 88307 TISSUE EXAM BY PATHOLOGIST: CPT | Performed by: OBSTETRICS & GYNECOLOGY

## 2023-12-27 RX ORDER — ONDANSETRON 2 MG/ML
4 INJECTION INTRAMUSCULAR; INTRAVENOUS ONCE AS NEEDED
Status: DISCONTINUED | OUTPATIENT
Start: 2023-12-27 | End: 2023-12-27 | Stop reason: HOSPADM

## 2023-12-27 RX ORDER — PHENYLEPHRINE HCL 10 MG/ML
VIAL (ML) INJECTION AS NEEDED
Status: DISCONTINUED | OUTPATIENT
Start: 2023-12-27 | End: 2023-12-27 | Stop reason: SURG

## 2023-12-27 RX ORDER — DEXTROSE, SODIUM CHLORIDE, SODIUM LACTATE, POTASSIUM CHLORIDE, AND CALCIUM CHLORIDE 5; .6; .31; .03; .02 G/100ML; G/100ML; G/100ML; G/100ML; G/100ML
INJECTION, SOLUTION INTRAVENOUS CONTINUOUS PRN
Status: DISCONTINUED | OUTPATIENT
Start: 2023-12-27 | End: 2023-12-30

## 2023-12-27 RX ORDER — KETOROLAC TROMETHAMINE 30 MG/ML
30 INJECTION, SOLUTION INTRAMUSCULAR; INTRAVENOUS EVERY 6 HOURS
Status: COMPLETED | OUTPATIENT
Start: 2023-12-27 | End: 2023-12-28

## 2023-12-27 RX ORDER — DIPHENHYDRAMINE HYDROCHLORIDE 50 MG/ML
25 INJECTION INTRAMUSCULAR; INTRAVENOUS ONCE AS NEEDED
Status: DISCONTINUED | OUTPATIENT
Start: 2023-12-27 | End: 2023-12-27 | Stop reason: HOSPADM

## 2023-12-27 RX ORDER — MEPERIDINE HYDROCHLORIDE 25 MG/ML
12.5 INJECTION INTRAMUSCULAR; INTRAVENOUS; SUBCUTANEOUS ONCE AS NEEDED
Status: DISCONTINUED | OUTPATIENT
Start: 2023-12-27 | End: 2023-12-27 | Stop reason: HOSPADM

## 2023-12-27 RX ORDER — KETOROLAC TROMETHAMINE 30 MG/ML
30 INJECTION, SOLUTION INTRAMUSCULAR; INTRAVENOUS ONCE
Status: DISCONTINUED | OUTPATIENT
Start: 2023-12-27 | End: 2023-12-27 | Stop reason: HOSPADM

## 2023-12-27 RX ORDER — GABAPENTIN 300 MG/1
300 CAPSULE ORAL EVERY 8 HOURS PRN
Status: DISCONTINUED | OUTPATIENT
Start: 2023-12-27 | End: 2023-12-30

## 2023-12-27 RX ORDER — SODIUM CHLORIDE, SODIUM LACTATE, POTASSIUM CHLORIDE, CALCIUM CHLORIDE 600; 310; 30; 20 MG/100ML; MG/100ML; MG/100ML; MG/100ML
INJECTION, SOLUTION INTRAVENOUS CONTINUOUS
Status: DISCONTINUED | OUTPATIENT
Start: 2023-12-27 | End: 2023-12-27 | Stop reason: HOSPADM

## 2023-12-27 RX ORDER — KETOROLAC TROMETHAMINE 30 MG/ML
INJECTION, SOLUTION INTRAMUSCULAR; INTRAVENOUS AS NEEDED
Status: DISCONTINUED | OUTPATIENT
Start: 2023-12-27 | End: 2023-12-27 | Stop reason: SURG

## 2023-12-27 RX ORDER — IBUPROFEN 600 MG/1
600 TABLET ORAL ONCE AS NEEDED
Status: DISCONTINUED | OUTPATIENT
Start: 2023-12-27 | End: 2023-12-27 | Stop reason: HOSPADM

## 2023-12-27 RX ORDER — HYDROMORPHONE HYDROCHLORIDE 1 MG/ML
0.4 INJECTION, SOLUTION INTRAMUSCULAR; INTRAVENOUS; SUBCUTANEOUS EVERY 5 MIN PRN
Status: DISCONTINUED | OUTPATIENT
Start: 2023-12-27 | End: 2023-12-27 | Stop reason: HOSPADM

## 2023-12-27 RX ORDER — DOCUSATE SODIUM 100 MG/1
100 CAPSULE, LIQUID FILLED ORAL
Status: DISCONTINUED | OUTPATIENT
Start: 2023-12-27 | End: 2023-12-30

## 2023-12-27 RX ORDER — BISACODYL 10 MG
10 SUPPOSITORY, RECTAL RECTAL ONCE AS NEEDED
Status: DISCONTINUED | OUTPATIENT
Start: 2023-12-27 | End: 2023-12-30

## 2023-12-27 RX ORDER — LIDOCAINE HYDROCHLORIDE 10 MG/ML
INJECTION, SOLUTION EPIDURAL; INFILTRATION; INTRACAUDAL; PERINEURAL AS NEEDED
Status: DISCONTINUED | OUTPATIENT
Start: 2023-12-27 | End: 2023-12-27 | Stop reason: SURG

## 2023-12-27 RX ORDER — DEXAMETHASONE SODIUM PHOSPHATE 4 MG/ML
VIAL (ML) INJECTION AS NEEDED
Status: DISCONTINUED | OUTPATIENT
Start: 2023-12-27 | End: 2023-12-27 | Stop reason: SURG

## 2023-12-27 RX ORDER — ACETAMINOPHEN 500 MG
1000 TABLET ORAL EVERY 6 HOURS PRN
Status: DISCONTINUED | OUTPATIENT
Start: 2023-12-27 | End: 2023-12-27 | Stop reason: HOSPADM

## 2023-12-27 RX ORDER — SIMETHICONE 80 MG
80 TABLET,CHEWABLE ORAL 3 TIMES DAILY PRN
Status: DISCONTINUED | OUTPATIENT
Start: 2023-12-27 | End: 2023-12-30

## 2023-12-27 RX ORDER — CITRIC ACID/SODIUM CITRATE 334-500MG
30 SOLUTION, ORAL ORAL AS NEEDED
Status: DISCONTINUED | OUTPATIENT
Start: 2023-12-27 | End: 2023-12-27 | Stop reason: HOSPADM

## 2023-12-27 RX ORDER — MORPHINE SULFATE 2 MG/ML
INJECTION, SOLUTION INTRAMUSCULAR; INTRAVENOUS AS NEEDED
Status: DISCONTINUED | OUTPATIENT
Start: 2023-12-27 | End: 2023-12-27 | Stop reason: SURG

## 2023-12-27 RX ORDER — TRANEXAMIC ACID 10 MG/ML
INJECTION, SOLUTION INTRAVENOUS
Status: DISPENSED
Start: 2023-12-27 | End: 2023-12-28

## 2023-12-27 RX ORDER — TRANEXAMIC ACID 10 MG/ML
INJECTION, SOLUTION INTRAVENOUS AS NEEDED
Status: DISCONTINUED | OUTPATIENT
Start: 2023-12-27 | End: 2023-12-27 | Stop reason: SURG

## 2023-12-27 RX ORDER — ONDANSETRON 2 MG/ML
INJECTION INTRAMUSCULAR; INTRAVENOUS AS NEEDED
Status: DISCONTINUED | OUTPATIENT
Start: 2023-12-27 | End: 2023-12-27 | Stop reason: SURG

## 2023-12-27 RX ORDER — HYDROMORPHONE HYDROCHLORIDE 1 MG/ML
INJECTION, SOLUTION INTRAMUSCULAR; INTRAVENOUS; SUBCUTANEOUS
Status: DISPENSED
Start: 2023-12-27 | End: 2023-12-28

## 2023-12-27 RX ORDER — ONDANSETRON 2 MG/ML
4 INJECTION INTRAMUSCULAR; INTRAVENOUS EVERY 6 HOURS PRN
Status: DISCONTINUED | OUTPATIENT
Start: 2023-12-27 | End: 2023-12-27 | Stop reason: HOSPADM

## 2023-12-27 RX ORDER — NALBUPHINE HYDROCHLORIDE 10 MG/ML
2.5 INJECTION, SOLUTION INTRAMUSCULAR; INTRAVENOUS; SUBCUTANEOUS
Status: DISCONTINUED | OUTPATIENT
Start: 2023-12-27 | End: 2023-12-27 | Stop reason: HOSPADM

## 2023-12-27 RX ORDER — ACETAMINOPHEN 500 MG
500 TABLET ORAL EVERY 6 HOURS PRN
Status: DISCONTINUED | OUTPATIENT
Start: 2023-12-27 | End: 2023-12-27 | Stop reason: HOSPADM

## 2023-12-27 RX ORDER — SODIUM CHLORIDE, SODIUM LACTATE, POTASSIUM CHLORIDE, CALCIUM CHLORIDE 600; 310; 30; 20 MG/100ML; MG/100ML; MG/100ML; MG/100ML
INJECTION, SOLUTION INTRAVENOUS CONTINUOUS PRN
Status: DISCONTINUED | OUTPATIENT
Start: 2023-12-27 | End: 2023-12-27 | Stop reason: SURG

## 2023-12-27 RX ORDER — HYDROMORPHONE HYDROCHLORIDE 1 MG/ML
0.2 INJECTION, SOLUTION INTRAMUSCULAR; INTRAVENOUS; SUBCUTANEOUS EVERY 5 MIN PRN
Status: DISCONTINUED | OUTPATIENT
Start: 2023-12-27 | End: 2023-12-27 | Stop reason: HOSPADM

## 2023-12-27 RX ORDER — ACETAMINOPHEN 500 MG
1000 TABLET ORAL EVERY 6 HOURS
Status: DISCONTINUED | OUTPATIENT
Start: 2023-12-27 | End: 2023-12-30

## 2023-12-27 RX ORDER — HYDROMORPHONE HYDROCHLORIDE 1 MG/ML
0.2 INJECTION, SOLUTION INTRAMUSCULAR; INTRAVENOUS; SUBCUTANEOUS EVERY 5 MIN PRN
Status: DISCONTINUED | OUTPATIENT
Start: 2023-12-27 | End: 2023-12-28

## 2023-12-27 RX ORDER — CEFAZOLIN SODIUM/WATER 2 G/20 ML
SYRINGE (ML) INTRAVENOUS
Status: DISPENSED
Start: 2023-12-27 | End: 2023-12-28

## 2023-12-27 RX ORDER — KETOROLAC TROMETHAMINE 30 MG/ML
INJECTION, SOLUTION INTRAMUSCULAR; INTRAVENOUS
Status: DISCONTINUED
Start: 2023-12-27 | End: 2023-12-27 | Stop reason: WASHOUT

## 2023-12-27 RX ORDER — HYDROMORPHONE HYDROCHLORIDE 1 MG/ML
0.2 INJECTION, SOLUTION INTRAMUSCULAR; INTRAVENOUS; SUBCUTANEOUS EVERY 30 MIN PRN
Status: DISCONTINUED | OUTPATIENT
Start: 2023-12-27 | End: 2023-12-27

## 2023-12-27 RX ORDER — DEXTROSE, SODIUM CHLORIDE, SODIUM LACTATE, POTASSIUM CHLORIDE, AND CALCIUM CHLORIDE 5; .6; .31; .03; .02 G/100ML; G/100ML; G/100ML; G/100ML; G/100ML
INJECTION, SOLUTION INTRAVENOUS AS NEEDED
Status: DISCONTINUED | OUTPATIENT
Start: 2023-12-27 | End: 2023-12-27 | Stop reason: HOSPADM

## 2023-12-27 RX ORDER — IBUPROFEN 600 MG/1
600 TABLET ORAL EVERY 6 HOURS
Status: DISCONTINUED | OUTPATIENT
Start: 2023-12-29 | End: 2023-12-30

## 2023-12-27 RX ORDER — METOCLOPRAMIDE HYDROCHLORIDE 5 MG/ML
INJECTION INTRAMUSCULAR; INTRAVENOUS AS NEEDED
Status: DISCONTINUED | OUTPATIENT
Start: 2023-12-27 | End: 2023-12-27 | Stop reason: SURG

## 2023-12-27 RX ORDER — TERBUTALINE SULFATE 1 MG/ML
0.25 INJECTION, SOLUTION SUBCUTANEOUS AS NEEDED
Status: DISCONTINUED | OUTPATIENT
Start: 2023-12-27 | End: 2023-12-27 | Stop reason: HOSPADM

## 2023-12-27 RX ORDER — SODIUM CHLORIDE, SODIUM LACTATE, POTASSIUM CHLORIDE, CALCIUM CHLORIDE 600; 310; 30; 20 MG/100ML; MG/100ML; MG/100ML; MG/100ML
INJECTION, SOLUTION INTRAVENOUS CONTINUOUS
Status: DISCONTINUED | OUTPATIENT
Start: 2023-12-27 | End: 2023-12-30

## 2023-12-27 RX ADMIN — ONDANSETRON 4 MG: 2 INJECTION INTRAMUSCULAR; INTRAVENOUS at 17:14:00

## 2023-12-27 RX ADMIN — TRANEXAMIC ACID 1000 MG: 10 INJECTION, SOLUTION INTRAVENOUS at 17:20:00

## 2023-12-27 RX ADMIN — KETOROLAC TROMETHAMINE 30 MG: 30 INJECTION, SOLUTION INTRAMUSCULAR; INTRAVENOUS at 17:48:00

## 2023-12-27 RX ADMIN — PHENYLEPHRINE HCL 100 MCG: 10 MG/ML VIAL (ML) INJECTION at 17:41:00

## 2023-12-27 RX ADMIN — DEXAMETHASONE SODIUM PHOSPHATE 4 MG: 4 MG/ML VIAL (ML) INJECTION at 17:14:00

## 2023-12-27 RX ADMIN — SODIUM CHLORIDE, SODIUM LACTATE, POTASSIUM CHLORIDE, CALCIUM CHLORIDE: 600; 310; 30; 20 INJECTION, SOLUTION INTRAVENOUS at 17:05:00

## 2023-12-27 RX ADMIN — MORPHINE SULFATE 2 MG: 2 INJECTION, SOLUTION INTRAMUSCULAR; INTRAVENOUS at 17:23:00

## 2023-12-27 RX ADMIN — MORPHINE SULFATE 2 MG: 2 INJECTION, SOLUTION INTRAMUSCULAR; INTRAVENOUS at 17:45:00

## 2023-12-27 RX ADMIN — METOCLOPRAMIDE HYDROCHLORIDE 10 MG: 5 INJECTION INTRAMUSCULAR; INTRAVENOUS at 17:14:00

## 2023-12-27 RX ADMIN — PHENYLEPHRINE HCL 100 MCG: 10 MG/ML VIAL (ML) INJECTION at 17:43:00

## 2023-12-27 RX ADMIN — LIDOCAINE HYDROCHLORIDE 25 MG: 10 INJECTION, SOLUTION EPIDURAL; INFILTRATION; INTRACAUDAL; PERINEURAL at 17:10:00

## 2023-12-27 NOTE — PLAN OF CARE
Problem: BIRTH - VAGINAL/ SECTION  Goal: Fetal and maternal status remain reassuring during the birth process  Description: INTERVENTIONS:  - Monitor vital signs  - Monitor fetal heart rate  - Monitor uterine activity  - Monitor labor progression (vaginal delivery)  - DVT prophylaxis (C/S delivery)  - Surgical antibiotic prophylaxis (C/S delivery)  Outcome: Completed     Problem: PAIN - ADULT  Goal: Verbalizes/displays adequate comfort level or patient's stated pain goal  Description: INTERVENTIONS:  - Encourage pt to monitor pain and request assistance  - Assess pain using appropriate pain scale  - Administer analgesics based on type and severity of pain and evaluate response  - Implement non-pharmacological measures as appropriate and evaluate response  - Consider cultural and social influences on pain and pain management  - Manage/alleviate anxiety  - Utilize distraction and/or relaxation techniques  - Monitor for opioid side effects  - Notify MD/LIP if interventions unsuccessful or patient reports new pain  - Anticipate increased pain with activity and pre-medicate as appropriate  Outcome: Completed     Problem: ANXIETY  Goal: Will report anxiety at manageable levels  Description: INTERVENTIONS:  - Administer medication as ordered  - Teach and rehearse alternative coping skills  - Provide emotional support with 1:1 interaction with staff  Outcome: Completed     Problem: Patient/Family Goals  Goal: Patient/Family Long Term Goal  Description: Patient's Long Term Goal: Uncomplicated vaginal delivery    Interventions:  VS per protocol  I&O  Ice chips and sips as tolerated  EFM per protocol  Maintain IV as ordered  Antibiotics as needed per protocol  Informed consent      Interventions:    - See additional Care Plan goals for specific interventions  Outcome: Completed  Goal: Patient/Family Short Term Goal  Description: Patient's Short Term Goal: Adequate pain control with delivery of infant  Interventions:  Pain assessment scores as ordered  Patient scores pain a \"3\" or less  Multidisciplinary care   Nonpharmacologic comfort measures        Interventions:     - See additional Care Plan goals for specific interventions  Outcome: Completed

## 2023-12-27 NOTE — OPERATIVE REPORT
[facility]  Post-Operative Note    Jeffrey Martinez Patient Status:  Inpatient    5/3/1990 MRN [id number]   Location [address] [facility] Attending Roddy Quiroz MD   Hosp Day # 0 PCP Jamir Grimaldo MD     Pre-operative Diagnosis: IUP @39 5/7 wks, placental abruption, desires tubal sterilization    Post-operative Diagnosis: same as above    Procedure: LTCS and bilateral salpingectomy    Attending: Dr. Rachel Orr    Assistant: Dr. Maco Chowdhury  The surgical assistant provided aid in exposure, hemostasis, closure, and other operative technical functions to aid the surgeon in carrying out a safe operation and optimize results    Anesthesia: general    Findings:  Viable female infant, cephalic presentation. Apgars of 9/9, weighing 7 pounds and 3 ounces. Placenta removed--large amount of blood clot noted. Uterus, tubes, and ovaries appeared normal.  Estimated Blood Loss: 700 ml    Specimens: cord blood, gases    Complications: none    Condition: stable    Procedure: The risks, benefits, complications, treatment options, and expected outcomes were discussed with the patient. The patient concurred with the proposed plan, giving informed consent. After informed consent was made, the patient was taken back to the operating room. A Time Out was held and the above information confirmed. After induction of general anesthesia, the patient was prepped and draped in the usual sterile manner. A pfannenstiel skin incision was then made and carried down through the subcutaneous tissue to the fascia. The fascia was nicked in the midline. This incision was then extended laterally using the Norwood scissors. The lower aspect of the fascial incision was grasped with kochers and elevated. The rectus muscles were dissected off using the Norwood scissors. This was repeated at the superior portion of the fascial incision.  The peritoneum was entered bluntly, was then extended superiorly and inferiorly with good visualization of the bladder. The bladder blade was inserted. A low transverse uterine incision was made on the uterus above the bladder reflection. The incision was extended using blunt dissection. The infant's head was delivered atraumatically. The remainder of the infant was delivered without difficulty. After the umbilical cord was clamped and cut. The cord blood was obtained for evaluation. The placenta was removed and appeared to have blood clot below in the vaginal canal but not in the uterine cavity. . The uterus was exteriorized and cleared of all clots and debris. The uterine incision was closed with running locked sutures of 0-vicryl. A second layer of the same suture was then placed. Hemostasis was observed. The fallopian tubes were elevated with babcocks. Beginning on the right the tubes were removed using the Enseal. This was repeated on the left, The uterus was returned to the abdomen and thorough evaluation of the abdomen and pelvis revealed hemostasis. The fascia was then reapproximated with running sutures of 0-vicryl. The subcutaneous fat was closed with 2-0 plain. The skin was reapproximated with 4-0 vicryl in a subcutaneous manner. The patient was taken to the recovery room in stable and satisfactory condition. All instrument, sponge, and needle counts were correct prior the abdominal closure and at the conclusion of the case.     Gail Cummings MD  12/27/2023  5:56 PM

## 2023-12-27 NOTE — ANESTHESIA PROCEDURE NOTES
Airway  Date/Time: 12/27/2023 5:12 PM  Urgency: elective      General Information and Staff    Patient location during procedure: OR  Anesthesiologist: Monique Brooks MD  Performed: anesthesiologist   Performed by: Monique Brooks MD  Authorized by: Monique Brooks MD      Indications and Patient Condition  Indications for airway management: anesthesia  Spontaneous Ventilation: absent  Sedation level: deep  Preoxygenated: yes  Patient position: sniffing  Mask difficulty assessment: 0 - not attempted    Final Airway Details  Final airway type: endotracheal airway      Successful airway: ETT  Cuffed: yes   Successful intubation technique: direct laryngoscopy  Endotracheal tube insertion site: oral  Blade: Harley  Blade size: #3  ETT size (mm): 6.5    Cormack-Lehane Classification: grade I - full view of glottis  Placement verified by: capnometry   Cuff volume (mL): 7  Measured from: lips  ETT to lips (cm): 22  Number of attempts at approach: 1  Number of other approaches attempted: 0    Additional Comments  Smooth induction, smooth intubation, no trauma to teeth

## 2023-12-27 NOTE — PLAN OF CARE
Problem: BIRTH - VAGINAL/ SECTION  Goal: Fetal and maternal status remain reassuring during the birth process  Description: INTERVENTIONS:  - Monitor vital signs  - Monitor fetal heart rate  - Monitor uterine activity  - Monitor labor progression (vaginal delivery)  - DVT prophylaxis (C/S delivery)  - Surgical antibiotic prophylaxis (C/S delivery)  Outcome: Progressing     Problem: PAIN - ADULT  Goal: Verbalizes/displays adequate comfort level or patient's stated pain goal  Description: INTERVENTIONS:  - Encourage pt to monitor pain and request assistance  - Assess pain using appropriate pain scale  - Administer analgesics based on type and severity of pain and evaluate response  - Implement non-pharmacological measures as appropriate and evaluate response  - Consider cultural and social influences on pain and pain management  - Manage/alleviate anxiety  - Utilize distraction and/or relaxation techniques  - Monitor for opioid side effects  - Notify MD/LIP if interventions unsuccessful or patient reports new pain  - Anticipate increased pain with activity and pre-medicate as appropriate  Outcome: Progressing     Problem: ANXIETY  Goal: Will report anxiety at manageable levels  Description: INTERVENTIONS:  - Administer medication as ordered  - Teach and rehearse alternative coping skills  - Provide emotional support with 1:1 interaction with staff  Outcome: Progressing     Problem: Patient/Family Goals  Goal: Patient/Family Long Term Goal  Description: Patient's Long Term Goal: Uncomplicated vaginal delivery    Interventions:  VS per protocol  I&O  Ice chips and sips as tolerated  EFM per protocol  Maintain IV as ordered  Antibiotics as needed per protocol  Informed consent      Interventions:    - See additional Care Plan goals for specific interventions  Outcome: Progressing  Goal: Patient/Family Short Term Goal  Description: Patient's Short Term Goal: Adequate pain control with delivery of infant  Interventions:  Pain assessment scores as ordered  Patient scores pain a \"3\" or less  Multidisciplinary care   Nonpharmacologic comfort measures        Interventions:     - See additional Care Plan goals for specific interventions  Outcome: Progressing

## 2023-12-28 LAB
BASOPHILS # BLD AUTO: 0.01 X10(3) UL (ref 0–0.2)
BASOPHILS NFR BLD AUTO: 0.1 %
EOSINOPHIL # BLD AUTO: 0 X10(3) UL (ref 0–0.7)
EOSINOPHIL NFR BLD AUTO: 0 %
ERYTHROCYTE [DISTWIDTH] IN BLOOD BY AUTOMATED COUNT: 13.2 %
HCT VFR BLD AUTO: 25 %
HGB BLD-MCNC: 8.5 G/DL
IMM GRANULOCYTES # BLD AUTO: 0.07 X10(3) UL (ref 0–1)
IMM GRANULOCYTES NFR BLD: 0.8 %
LYMPHOCYTES # BLD AUTO: 0.87 X10(3) UL (ref 1–4)
LYMPHOCYTES NFR BLD AUTO: 9.6 %
MCH RBC QN AUTO: 31.5 PG (ref 26–34)
MCHC RBC AUTO-ENTMCNC: 34 G/DL (ref 31–37)
MCV RBC AUTO: 92.6 FL
MONOCYTES # BLD AUTO: 0.6 X10(3) UL (ref 0.1–1)
MONOCYTES NFR BLD AUTO: 6.6 %
NEUTROPHILS # BLD AUTO: 7.5 X10 (3) UL (ref 1.5–7.7)
NEUTROPHILS # BLD AUTO: 7.5 X10(3) UL (ref 1.5–7.7)
NEUTROPHILS NFR BLD AUTO: 82.9 %
PLATELET # BLD AUTO: 137 10(3)UL (ref 150–450)
RBC # BLD AUTO: 2.7 X10(6)UL
WBC # BLD AUTO: 9.1 X10(3) UL (ref 4–11)

## 2023-12-28 PROCEDURE — 85025 COMPLETE CBC W/AUTO DIFF WBC: CPT | Performed by: OBSTETRICS & GYNECOLOGY

## 2023-12-28 RX ORDER — NALOXONE HYDROCHLORIDE 0.4 MG/ML
0.08 INJECTION, SOLUTION INTRAMUSCULAR; INTRAVENOUS; SUBCUTANEOUS
Status: DISCONTINUED | OUTPATIENT
Start: 2023-12-28 | End: 2023-12-28

## 2023-12-28 RX ORDER — SODIUM CHLORIDE 9 MG/ML
INJECTION, SOLUTION INTRAVENOUS CONTINUOUS
Status: DISCONTINUED | OUTPATIENT
Start: 2023-12-28 | End: 2023-12-28

## 2023-12-28 RX ORDER — HYDROMORPHONE HYDROCHLORIDE 1 MG/ML
0.2 INJECTION, SOLUTION INTRAMUSCULAR; INTRAVENOUS; SUBCUTANEOUS EVERY 5 MIN PRN
Status: DISCONTINUED | OUTPATIENT
Start: 2023-12-28 | End: 2023-12-30

## 2023-12-28 RX ORDER — DIPHENHYDRAMINE HYDROCHLORIDE 50 MG/ML
12.5 INJECTION INTRAMUSCULAR; INTRAVENOUS EVERY 4 HOURS PRN
Status: DISCONTINUED | OUTPATIENT
Start: 2023-12-28 | End: 2023-12-28

## 2023-12-28 RX ORDER — HYDROMORPHONE HYDROCHLORIDE 1 MG/ML
0.5 INJECTION, SOLUTION INTRAMUSCULAR; INTRAVENOUS; SUBCUTANEOUS ONCE
Status: COMPLETED | OUTPATIENT
Start: 2023-12-28 | End: 2023-12-28

## 2023-12-28 RX ORDER — OXYCODONE HYDROCHLORIDE 5 MG/1
10 TABLET ORAL EVERY 4 HOURS PRN
Status: DISCONTINUED | OUTPATIENT
Start: 2023-12-28 | End: 2023-12-28

## 2023-12-28 RX ORDER — ONDANSETRON 2 MG/ML
4 INJECTION INTRAMUSCULAR; INTRAVENOUS EVERY 6 HOURS PRN
Status: DISCONTINUED | OUTPATIENT
Start: 2023-12-28 | End: 2023-12-28

## 2023-12-28 RX ORDER — OXYCODONE HYDROCHLORIDE 5 MG/1
5 TABLET ORAL EVERY 4 HOURS PRN
Status: DISCONTINUED | OUTPATIENT
Start: 2023-12-28 | End: 2023-12-28

## 2023-12-28 RX ORDER — OXYCODONE HYDROCHLORIDE 5 MG/1
5 TABLET ORAL EVERY 4 HOURS PRN
Status: DISCONTINUED | OUTPATIENT
Start: 2023-12-28 | End: 2023-12-30

## 2023-12-28 NOTE — ANESTHESIA PROCEDURE NOTES
Regional Block    Date/Time: 12/27/2023 6:02 PM    Performed by: Gavin Win MD  Authorized by: Gavin Win MD      General Information and Staff    Start Time:  12/27/2023 6:02 PM  End Time:  12/27/2023 6:09 PM  Anesthesiologist:  Gavin Win MD  Performed by: Anesthesiologist  Patient Location:  OR      Site Identification: real time ultrasound guided, surface landmarks and image stored and retrievable    Block site/laterality marked before start: site marked  Reason for Block: at surgeon's request, post-op pain management and procedure for pain    Preanesthetic Checklist: 2 patient identifers, IV checked, risks and benefits discussed, monitors and equipment checked, pre-op evaluation, timeout performed, anesthesia consent, sterile technique used, no prohibitive neurological deficits and no local skin infection at insertion site      Procedure Details    Patient Position:  Sitting  Prep: ChloraPrep    Monitoring:  Cardiac monitor, continuous pulse ox and blood pressure cuff  Block Type:  TAP  Laterality:  Right  Injection Technique:  Single-shot    Needle    Needle Type:  Short-bevel and echogenic  Needle Gauge:  21 G  Needle Length:  100 mm  Needle Localization:  Ultrasound guidance  Reason for Ultrasound Use: appropriate spread of the medication was noted in real time and no ultrasound evidence of intravascular and/or intraneural injection          Needle Insertion Depth:  2    Assessment    Injection Assessment:  Good spread noted, negative resistance, negative aspiration for heme, incremental injection and low pressure  Heart Rate Change: No    - Patient tolerated block procedure well without evidence of immediate block related complications. Medications  12/27/2023 6:02 PM      Additional Comments    Medication:  Ropivacaine 0.25% 30mL, decadron 2mg PF    Ultrasound used to visualize needle and spread of local anesthetic.  Image saved

## 2023-12-28 NOTE — PROGRESS NOTES
Pt admitted to MB unit. Admission assessment completed. Discharge education initiated with pt. Pt instructed on call light usage. Plan of care discussed with pt and spouse at bedside, both verbalizes understanding.

## 2023-12-28 NOTE — PLAN OF CARE
Problem: POSTPARTUM  Goal: Long Term Goal:Experiences normal postpartum course  Description: INTERVENTIONS:  - Assess and monitor vital signs and lab values. - Assess fundus and lochia. - Provide ice/sitz baths for perineum discomfort. - Monitor healing of incision/episiotomy/laceration, and assess for signs and symptoms of infection and hematoma. - Assess bladder function and monitor for bladder distention.  - Provide/instruct/assist with pericare as needed. - Provide VTE prophylaxis as needed. - Monitor bowel function.  - Encourage ambulation and provide assistance as needed. - Assess and monitor emotional status and provide social service/psych resources as needed. - Utilize standard precautions and use personal protective equipment as indicated. Ensure aseptic care of all intravenous lines and invasive tubes/drains.  - Obtain immunization and exposure to communicable diseases history. Outcome: Progressing  Goal: Optimize infant feeding at the breast  Description: INTERVENTIONS:  - Initiate breast feeding within first hour after birth. - Monitor effectiveness of current breast feeding efforts. - Assess support systems available to mother/family.  - Identify cultural beliefs/practices regarding lactation, letdown techniques, maternal food preferences. - Assess mother's knowledge and previous experience with breast feeding.  - Provide information as needed about early infant feeding cues (e.g., rooting, lip smacking, sucking fingers/hand) versus late cue of crying.  - Discuss/demonstrate breast feeding aids (e.g., infant sling, nursing footstool/pillows, and breast pumps). - Encourage mother/other family members to express feelings/concerns, and actively listen. - Educate father/SO about benefits of breast feeding and how to manage common lactation challenges.   - Recommend avoidance of specific medications or substances incompatible with breast feeding.  - Assess and monitor for signs of nipple pain/trauma. - Instruct and provide assistance with proper latch. - Review techniques for milk expression (breast pumping) and storage of breast milk. Provide pumping equipment/supplies, instructions and assistance, as needed. - Encourage rooming-in and breast feeding on demand.  - Encourage skin-to-skin contact. - Provide LC support as needed. - Assess for and manage engorgement. - Provide breast feeding education handouts and information on community breast feeding support. Outcome: Progressing  Goal: Establishment of adequate milk supply with medication/procedure interruptions  Description: INTERVENTIONS:  - Review techniques for milk expression (breast pumping). - Provide pumping equipment/supplies, instructions, and assistance until it is safe to breastfeed infant. Outcome: Progressing  Goal: Appropriate maternal -  bonding  Description: INTERVENTIONS:  - Assess caregiver- interactions. - Assess caregiver's emotional status and coping mechanisms. - Encourage caregiver to participate in  daily care. - Assess support systems available to mother/family.  - Provide /case management support as needed.   Outcome: Progressing     Problem: GASTROINTESTINAL - ADULT  Goal: Minimal or absence of nausea and vomiting  Description: INTERVENTIONS:  - Maintain adequate hydration with IV or PO as ordered and tolerated  - Nasogastric tube to low intermittent suction as ordered  - Evaluate effectiveness of ordered antiemetic medications  - Provide nonpharmacologic comfort measures as appropriate  - Advance diet as tolerated, if ordered  - Obtain nutritional consult as needed  - Evaluate fluid balance  Outcome: Progressing  Goal: Maintains or returns to baseline bowel function  Description: INTERVENTIONS:  - Assess bowel function  - Maintain adequate hydration with IV or PO as ordered and tolerated  - Evaluate effectiveness of GI medications  - Encourage mobilization and activity  - Obtain nutritional consult as needed  - Establish a toileting routine/schedule  - Consider collaborating with pharmacy to review patient's medication profile  Outcome: Progressing

## 2023-12-28 NOTE — ANESTHESIA PROCEDURE NOTES
Regional Block    Date/Time: 12/27/2023 5:50 PM    Performed by: Pauline Kirk MD  Authorized by: Pauline Kirk MD      General Information and Staff    Start Time:  12/27/2023 5:50 PM  End Time:  12/27/2023 5:57 PM  Anesthesiologist:  Pauline Kirk MD  Performed by: Anesthesiologist  Patient Location:  OR    Block Placement: Post Induction  Site Identification: real time ultrasound guided and image stored and retrievable    Block site/laterality marked before start: site marked  Reason for Block: at surgeon's request, post-op pain management and procedure for pain    Preanesthetic Checklist: 2 patient identifers, IV checked, risks and benefits discussed, monitors and equipment checked, pre-op evaluation, timeout performed, anesthesia consent, sterile technique used, no prohibitive neurological deficits and no local skin infection at insertion site      Procedure Details    Patient Position:  Supine  Prep: ChloraPrep    Monitoring:  Cardiac monitor, continuous pulse ox and blood pressure cuff  Block Type:  TAP  Laterality:  Left  Injection Technique:  Single-shot    Needle    Needle Type:  Short-bevel and echogenic  Needle Gauge:  21 G  Needle Length:  100 mm  Needle Localization:  Ultrasound guidance  Reason for Ultrasound Use: appropriate spread of the medication was noted in real time and no ultrasound evidence of intravascular and/or intraneural injection          Needle Insertion Depth:  2.5    Assessment    Injection Assessment:  Good spread noted, negative resistance, negative aspiration for heme, incremental injection and low pressure  Heart Rate Change: No    - Patient tolerated block procedure well without evidence of immediate block related complications. Medications  12/27/2023 5:50 PM      Additional Comments    Medication:  Ropivacaine 0.25% 30mL, decadron 2mg PF    Ultrasound used to visualize needle, spread of local anesthetic. Image saved.

## 2023-12-28 NOTE — PROGRESS NOTES
Pt still reporting moderate to severe pain while laying still in bed despite pain medication administration and prn dilaudid. This RN spoke with anesthesiologist, see orders for dilaudid PCA. PCA started with 2nd RN Rebeca Pierce. PCA explained to patient, pt verbalizes good understanding. Report given to [facility].

## 2023-12-29 NOTE — PLAN OF CARE
Problem: POSTPARTUM  Goal: Long Term Goal:Experiences normal postpartum course  Description: INTERVENTIONS:  - Assess and monitor vital signs and lab values. - Assess fundus and lochia. - Provide ice/sitz baths for perineum discomfort. - Monitor healing of incision/episiotomy/laceration, and assess for signs and symptoms of infection and hematoma. - Assess bladder function and monitor for bladder distention.  - Provide/instruct/assist with pericare as needed. - Provide VTE prophylaxis as needed. - Monitor bowel function.  - Encourage ambulation and provide assistance as needed. - Assess and monitor emotional status and provide social service/psych resources as needed. - Utilize standard precautions and use personal protective equipment as indicated. Ensure aseptic care of all intravenous lines and invasive tubes/drains.  - Obtain immunization and exposure to communicable diseases history. Outcome: Progressing  Goal: Appropriate maternal -  bonding  Description: INTERVENTIONS:  - Assess caregiver- interactions. - Assess caregiver's emotional status and coping mechanisms. - Encourage caregiver to participate in  daily care. - Assess support systems available to mother/family.  - Provide /case management support as needed.   Outcome: Progressing     Problem: GASTROINTESTINAL - ADULT  Goal: Maintains or returns to baseline bowel function  Description: INTERVENTIONS:  - Assess bowel function  - Maintain adequate hydration with IV or PO as ordered and tolerated  - Evaluate effectiveness of GI medications  - Encourage mobilization and activity  - Obtain nutritional consult as needed  - Establish a toileting routine/schedule  - Consider collaborating with pharmacy to review patient's medication profile  Outcome: Progressing     Problem: GASTROINTESTINAL - ADULT  Goal: Minimal or absence of nausea and vomiting  Description: INTERVENTIONS:  - Maintain adequate hydration with IV or PO as ordered and tolerated  - Nasogastric tube to low intermittent suction as ordered  - Evaluate effectiveness of ordered antiemetic medications  - Provide nonpharmacologic comfort measures as appropriate  - Advance diet as tolerated, if ordered  - Obtain nutritional consult as needed  - Evaluate fluid balance  Outcome: Completed

## 2023-12-29 NOTE — PROGRESS NOTES
Patient complaints: none. pain managed. Vital signs reviewed    Examination:  General: alert, appropriate affect  Abdomen: Fundus firm and no unexpected tenderness. Remainder of abdomen unremarkable  Incision: dry, intact, no unexpected findings  Lochia: appropriate  Extremities: nontender, no excessive edema, symmetric      Recent Labs   Lab 23  1844 23  2134 23  0606   RBC 3.10* 2.95* 2.70*   HGB 9.7* 9.3* 8.5*   HCT 28.7* 27.0* 25.0*   MCV 92.6 91.5 92.6   MCH 31.3 31.5 31.5   MCHC 33.8 34.4 34.0   RDW 13.1 13.1 13.2   NEPRELIM 10.64* 10.11* 7.50   WBC 11.8* 11.0 9.1   .0* 135.0* 137.0*         Impression:   Postoperative day #2 from  birth, recuperating appropriately, anticipate routine discharge  Abrutio placenta, anemia.  S/p venofer

## 2023-12-30 VITALS
WEIGHT: 210 LBS | DIASTOLIC BLOOD PRESSURE: 66 MMHG | TEMPERATURE: 98 F | RESPIRATION RATE: 18 BRPM | OXYGEN SATURATION: 95 % | BODY MASS INDEX: 33.75 KG/M2 | SYSTOLIC BLOOD PRESSURE: 106 MMHG | HEIGHT: 66 IN | HEART RATE: 64 BPM

## 2023-12-30 RX ORDER — OXYCODONE HYDROCHLORIDE 5 MG/1
5 TABLET ORAL
Qty: 5 TABLET | Refills: 0 | Status: SHIPPED | OUTPATIENT
Start: 2023-12-30

## 2023-12-30 RX ORDER — PSEUDOEPHEDRINE HCL 30 MG
100 TABLET ORAL 2 TIMES DAILY PRN
Qty: 30 CAPSULE | Refills: 0 | Status: SHIPPED | OUTPATIENT
Start: 2023-12-30

## 2023-12-30 NOTE — DISCHARGE SUMMARY
[facility]  OB/GYN Surgeon Discharge Summary    Suhas Cottrell \[facility]\"" Patient Status:  Inpatient    5/3/1990 MRN [id number]   [facility] 1SW-J Attending Cory Kemp MD   Hosp Day # 3 PCP Jonah Loo MD       Admit date: 2023    Discharge date : 23     Admitting Physician: Cory Kemp MD   Discharging Physician: Nic Meadows DO     Reason for admission:   Pregnancy at 39w5d gestation  History of fetal macrosomia in prior pregnancy   Unwanted fertility, desires sterilization     Procedures:   Primary low transverse  section with bilateral salpingectomy     Hospital Course:   Vicente Gradne is a 29year old [id number] who presented to L&D at 39w5d gestation for elective induction of labor. Pregnancy further complicated by history of macrosomia in prior pregnancy. During the course of her induction, she developed vaginal bleeding after spontaneous rupture of membranes. Bleeding continued with concern for placental abruption. As such, primary  was recommended. She requested sterilization at time of . She underwent an primary low transverse  section with bilateral salpingectomy. Intraoperative findings were consistent with placental abruption with large amount of clot was noted upon placental delivery. She had acute blood loss anemia and received venofer infusions prior to discharge. She had an otherwise normal postpartum recovery. She was discharged home in stable condition on postpartum day #3 with returns precautions discussed. Discharge Diagnoses:  Pregnancy at 39w5d gestation, delivered by  section  Status post primary low transverse  section  Status post bilateral salpingectomy   Placental abruption     Discharged Condition: good    Disposition: home    Patient Instructions:    Follow-up appointment with Dr. Seymour Li in 2 weeks for incision check and in 6 weeks for postpartum exam.      Amelia Lindsey DO [place]  12/30/2023  9:20 AM

## 2023-12-30 NOTE — DISCHARGE INSTRUCTIONS
Pain medications for home:  Take 600mg motrin (ibuprofen) every 6 hours as needed for pain. Take 1000mg tylenol (acetaminophen) every 6 hours as needed for pain. Take 5mg oxycodone every 4-6 hours as needed for breakthrough pain despite tylenol and motrin.

## 2023-12-30 NOTE — PLAN OF CARE
Problem: POSTPARTUM  Goal: Long Term Goal:Experiences normal postpartum course  Description: INTERVENTIONS:  - Assess and monitor vital signs and lab values. - Assess fundus and lochia. - Provide ice/sitz baths for perineum discomfort. - Monitor healing of incision/episiotomy/laceration, and assess for signs and symptoms of infection and hematoma. - Assess bladder function and monitor for bladder distention.  - Provide/instruct/assist with pericare as needed. - Provide VTE prophylaxis as needed. - Monitor bowel function.  - Encourage ambulation and provide assistance as needed. - Assess and monitor emotional status and provide social service/psych resources as needed. - Utilize standard precautions and use personal protective equipment as indicated. Ensure aseptic care of all intravenous lines and invasive tubes/drains.  - Obtain immunization and exposure to communicable diseases history. Outcome: Progressing  Goal: Appropriate maternal -  bonding  Description: INTERVENTIONS:  - Assess caregiver- interactions. - Assess caregiver's emotional status and coping mechanisms. - Encourage caregiver to participate in  daily care. - Assess support systems available to mother/family.  - Provide /case management support as needed.   Outcome: Progressing     Problem: GASTROINTESTINAL - ADULT  Goal: Maintains or returns to baseline bowel function  Description: INTERVENTIONS:  - Assess bowel function  - Maintain adequate hydration with IV or PO as ordered and tolerated  - Evaluate effectiveness of GI medications  - Encourage mobilization and activity  - Obtain nutritional consult as needed  - Establish a toileting routine/schedule  - Consider collaborating with pharmacy to review patient's medication profile  Outcome: Progressing

## 2024-01-02 ENCOUNTER — TELEPHONE (OUTPATIENT)
Dept: OBGYN UNIT | Facility: HOSPITAL | Age: 34
End: 2024-01-02

## 2024-02-07 ENCOUNTER — OFFICE VISIT (OUTPATIENT)
Dept: INTERNAL MEDICINE CLINIC | Facility: CLINIC | Age: 34
End: 2024-02-07
Payer: COMMERCIAL

## 2024-02-07 VITALS
SYSTOLIC BLOOD PRESSURE: 112 MMHG | WEIGHT: 192 LBS | DIASTOLIC BLOOD PRESSURE: 80 MMHG | TEMPERATURE: 98 F | OXYGEN SATURATION: 98 % | BODY MASS INDEX: 30.86 KG/M2 | HEIGHT: 66 IN | HEART RATE: 107 BPM

## 2024-02-07 DIAGNOSIS — E66.9 OBESITY (BMI 30-39.9): Primary | ICD-10-CM

## 2024-02-07 DIAGNOSIS — F90.9 ATTENTION DEFICIT HYPERACTIVITY DISORDER (ADHD), UNSPECIFIED ADHD TYPE: ICD-10-CM

## 2024-02-07 PROCEDURE — 99213 OFFICE O/P EST LOW 20 MIN: CPT | Performed by: FAMILY MEDICINE

## 2024-02-07 PROCEDURE — 3074F SYST BP LT 130 MM HG: CPT | Performed by: FAMILY MEDICINE

## 2024-02-07 PROCEDURE — 3079F DIAST BP 80-89 MM HG: CPT | Performed by: FAMILY MEDICINE

## 2024-02-07 PROCEDURE — 3008F BODY MASS INDEX DOCD: CPT | Performed by: FAMILY MEDICINE

## 2024-02-07 RX ORDER — DEXTROAMPHETAMINE SACCHARATE, AMPHETAMINE ASPARTATE MONOHYDRATE, DEXTROAMPHETAMINE SULFATE AND AMPHETAMINE SULFATE 2.5; 2.5; 2.5; 2.5 MG/1; MG/1; MG/1; MG/1
10 CAPSULE, EXTENDED RELEASE ORAL EVERY MORNING
Qty: 30 CAPSULE | Refills: 0 | Status: SHIPPED | OUTPATIENT
Start: 2024-02-07 | End: 2024-03-08

## 2024-02-07 NOTE — PROGRESS NOTES
Dolores Henning is a 33 year old female.  HPI:   Here to go over a few things     Last spring we had discussed ADHD meds after her visit w therapist       was on adderal in the past   was in HS last time    was a daily med 10mg    did seem to work well for her     Would like to try it again      is 6 weeks postpartum    Was also on phentermine for weight loss    asking to get back on it as well         Current Outpatient Medications   Medication Sig Dispense Refill    docusate sodium 100 MG Oral Cap Take 100 mg by mouth 2 (two) times daily as needed for constipation. 30 capsule 0    prenatal vitamin with DHA 27-0.8-228 MG Oral Cap Take 1 capsule by mouth daily.      Vitamin B12 100 MCG Oral Tab Take 1 tablet (100 mcg total) by mouth daily.      Cholecalciferol (VITAMIN D) 50 MCG (2000 UT) Oral Cap Take 2 capsules (4,000 Units total) by mouth daily.      Ascorbic Acid (VITAMIN C OR) Take 500 mg by mouth daily.      oxyCODONE 5 MG Oral Tab Take 1 tablet (5 mg total) by mouth every 4 to 6 hours as needed. (Patient not taking: Reported on 2/7/2024) 5 tablet 0      Past Medical History:   Diagnosis Date    Anemia     Hx Fe def anemia with pregnancies    Asthma     as a child    Chronic rhinitis     Encounter for supervision of other normal pregnancy, unspecified trimester 6/2/2020    Gastrointestinal disorder     celiac disease    Genitourinary disease     Hx UTIs    History of macrosomia in infant in prior pregnancy, currently pregnant 6/2/2020    Normal pregnancy 11/6/2020    Obesity affecting pregnancy, antepartum 10/12/2020    36w: NST weekly    PONV (postoperative nausea and vomiting)     1 episode    Viral upper respiratory tract infection with cough 2/19/2016      Social History:  Social History     Socioeconomic History    Marital status:    Tobacco Use    Smoking status: Never    Smokeless tobacco: Never   Vaping Use    Vaping Use: Never used   Substance and Sexual Activity    Alcohol use: Not  Currently    Drug use: No    Sexual activity: Yes     Birth control/protection: Pill   Other Topics Concern    Caffeine Concern Yes     Comment: 3-4 cups a day    Exercise No   Social History Narrative    ** Merged History Encounter **          Social Determinants of Health     Financial Resource Strain: Low Risk  (12/27/2023)    Financial Resource Strain     Difficulty of Paying Living Expenses: Not hard at all     Med Affordability: No   Recent Concern: Financial Resource Strain - High Risk (12/6/2023)    Financial Resource Strain     Difficulty of Paying Living Expenses: Not very hard     Med Affordability: Yes   Food Insecurity: No Food Insecurity (12/27/2023)    Food Insecurity     Food Insecurity: Never true   Transportation Needs: No Transportation Needs (12/27/2023)    Transportation Needs     Lack of Transportation: No   Stress: No Stress Concern Present (12/27/2023)    Stress     Feeling of Stress : No   Housing Stability: Low Risk  (12/27/2023)    Housing Stability     Housing Instability: No        REVIEW OF SYSTEMS:   GENERAL HEALTH: feels well otherwise     RESPIRATORY: denies shortness    CARDIOVASCULAR: denies chest pain      EXAM:   /80   Pulse 107   Temp 98 °F (36.7 °C) (Oral)   Ht 5' 6\" (1.676 m)   Wt 192 lb (87.1 kg)   LMP 02/02/2023   SpO2 98%   BMI 30.99 kg/m²   GENERAL: well developed, well nourished,in no apparent distress  SKIN: no rashes  NECK: supple,no adenopathy  LUNGS: clear to auscultation  CARDIO: RRR without murmur  EXTREMITIES: no edema    ASSESSMENT AND PLAN:   1. Obesity (BMI 30-39.9)  See below     2. Attention deficit hyperactivity disorder (ADHD), unspecified ADHD type  Discussed ADHD     will order adderral XR 10 daily to see how she does w it   Rather not add phentermine as well ,as the adderal will help w the weight as well     bj 1 mo       - amphetamine-dextroamphetamine ER (ADDERALL XR) 10 MG Oral Capsule SR 24 Hr; Take 1 capsule (10 mg total) by mouth  every morning.  Dispense: 30 capsule; Refill: 0     The patient indicates understanding of these issues and agrees to the plan.  .

## 2024-04-12 RX ORDER — DEXTROAMPHETAMINE SACCHARATE, AMPHETAMINE ASPARTATE MONOHYDRATE, DEXTROAMPHETAMINE SULFATE AND AMPHETAMINE SULFATE 2.5; 2.5; 2.5; 2.5 MG/1; MG/1; MG/1; MG/1
10 CAPSULE, EXTENDED RELEASE ORAL EVERY MORNING
Qty: 30 CAPSULE | Refills: 0 | OUTPATIENT
Start: 2024-04-12

## 2024-05-10 ENCOUNTER — OFFICE VISIT (OUTPATIENT)
Dept: NEUROLOGY | Facility: CLINIC | Age: 34
End: 2024-05-10
Payer: COMMERCIAL

## 2024-05-10 VITALS
HEART RATE: 94 BPM | RESPIRATION RATE: 16 BRPM | DIASTOLIC BLOOD PRESSURE: 70 MMHG | BODY MASS INDEX: 31.02 KG/M2 | SYSTOLIC BLOOD PRESSURE: 111 MMHG | WEIGHT: 193 LBS | HEIGHT: 66 IN

## 2024-05-10 DIAGNOSIS — R90.89 ABNORMAL FINDING ON MRI OF BRAIN: Primary | ICD-10-CM

## 2024-05-10 DIAGNOSIS — R20.2 PARESTHESIA: ICD-10-CM

## 2024-05-10 PROCEDURE — 99214 OFFICE O/P EST MOD 30 MIN: CPT | Performed by: OTHER

## 2024-05-10 PROCEDURE — 3078F DIAST BP <80 MM HG: CPT | Performed by: OTHER

## 2024-05-10 PROCEDURE — 3074F SYST BP LT 130 MM HG: CPT | Performed by: OTHER

## 2024-05-10 PROCEDURE — 3008F BODY MASS INDEX DOCD: CPT | Performed by: OTHER

## 2024-05-10 NOTE — PROGRESS NOTES
NEUROLOGY  [facility]       Dolores Henning  5/3/1990  Primary Care Provider:  Wilian Polanco MD    5/10/2024  34 year old yo,  was last seen on:: 2022    Seen for/plans last visit:  MRI abnormality with paresthesias  Seen at [facility] and told her unlikely to be MS  CSF normal    Previous visit and existing record notes reviewed in preparation for the face to face visit.  Relevant labs and studies reviewed and will be noted in relevant areas of this record.  Accompanied visit:     (x) No.      Present condition:      Past History update/new problem(s): as above    Review of Systems:  Review of Systems:  Denies systemic symptoms     No CP or SOB.  No GI or  symptoms. Relevant Neuro as noted above.      Medications:      Current Outpatient Medications:     amphetamine-dextroamphetamine ER (ADDERALL XR) 10 MG Oral Capsule SR 24 Hr, Take 1 capsule (10 mg total) by mouth daily., Disp: 30 capsule, Rfl: 0  PRN:     Allergies:  Allergies   Allergen Reactions    Seasonal Runny nose          EXAM:  /70 (BP Location: Left arm, Patient Position: Sitting, Cuff Size: large)   Pulse 94   Resp 16   Ht 66\"   Wt 193 lb (87.5 kg)   LMP 05/08/2024   BMI 31.15 kg/m²   Looks stated age  General Exam:  HENT:  pink conjunctiva anicteric sclerae  Neck no adenopathy, thyroid normal  Heart and Lungs:  normal  Extremities: no cyanosis, skin changes    NEURO  NEURO  Able to relate events with fluent speech and intact comprehension  CN 2-12: pupils reactive, VF full face symmetric sensation and movement tongue midline  No motor focal findings  Sensory: no lateralizing findings  Reflexes are symmetric  UMN signs: none  Gait: narrow based          INTERPRETATION of RELEVANT LABS and other DATA:          Problem/s Identified this visit:   1. Abnormal finding on MRI of brain    2. Paresthesia          Discussion plus Diagnostics & Treatment Orders:      Procedures    MRI BRAIN (W+WO) (CPT=70553) [[id number]]      Management and treatment will be contingent upon what the MRI will show.      (NA) Discussed potential side effects of any treatment relevant to above.  Includes explanation of tests as necessary.    No follow-ups on file.  Also dependent upon MRI findings.      Patient understands that if needed, based on condition and or test results, follow up will be readjusted      Sanjiv Carlos MD  Vascular & General Neurology  Director, Multiple Sclerosis Program  [facility]  5/10/2024, Time completed 12:08 PM    Decision making:  ( x ) labs reviewed/ordered - 1  (  ) new diagnosis: - 1  ( x) Images & studies independently reviewed -non F2F  (  ) Case/studies discussed with other caregivers - -non F2F  (  ) Telephone time with patiern or authorized Fam member--non F2F  ( x ) other records reviewed --non F2F including consultations  (  ) [facility] meetings - patient not present --non F2F  (  ) Independent Historian obtained    Non Face to Face CPT code 08905/85703 applies as documented above    PROCEDURE DONE     (   ) see notes        After visit, patient was escorted out and handed-off discharge process and instructions to the check out desk.  No additional issues relevant to visit were raised to staff at this time interval.        This document is to be interpreted as my current opinion regarding the case as of the stated date of service based on the information available to me at this time and may supersedes any prior opinion expressed either orally or in writing.  Services rendered are only within the scope of direct medical care  Sometimes, reports may have been prepared partially using a speech recognition software technology.  If a word or phrase is confusing or out of context, please do not hesitate to call for clarification.

## 2024-05-10 NOTE — PATIENT INSTRUCTIONS
Refill policies:    Allow 2-3 business days for refills; controlled substances may take longer.  Contact your pharmacy at least 5 days prior to running out of medication and have them send an electronic request or submit request through the “request refill” option in your [employer] account.  Refills are not addressed on weekends; covering physicians do not authorize routine medications on weekends.  No narcotics or controlled substances are refilled after noon on Fridays or by on call physicians.  By law, narcotics must be electronically prescribed.  A 30 day supply with no refills is the maximum allowed.  If your prescription is due for a refill, you may be due for a follow up appointment.  To best provide you care, patients receiving routine medications need to be seen at least once a year.  Patients receiving narcotic/controlled substance medications need to be seen at least once every 3 months.  In the event that your preferred pharmacy does not have the requested medication in stock (e.g. Backordered), it is your responsibility to find another pharmacy that has the requested medication available.  We will gladly send a new prescription to that pharmacy at your request.    Scheduling Tests:    If your physician has ordered radiology tests such as MRI or CT scans, please contact Central Scheduling at [phone] right away to schedule the test.  Once scheduled, the [facility] Centralized Referral Team will work with your insurance carrier to obtain pre-certification or prior authorization.  Depending on your insurance carrier, approval may take 3-10 days.  It is highly recommended patients assure they have received an authorization before having a test performed.  If test is done without insurance authorization, patient may be responsible for the entire amount billed.      Precertification and Prior Authorizations:  If your physician has recommended that you have a procedure or additional testing performed the [facility]  Centralized Referral Team will contact your insurance carrier to obtain pre-certification or prior authorization.    You are strongly encouraged to contact your insurance carrier to verify that your procedure/test has been approved and is a COVERED benefit.  Although the [facility] Centralized Referral Team does its due diligence, the insurance carrier gives the disclaimer that \"Although the procedure is authorized, this does not guarantee payment.\"    Ultimately the patient is responsible for payment.   Thank you for your understanding in this matter.  Paperwork Completion:  If you require FMLA or disability paperwork for your recovery, please make sure to either drop it off or have it faxed to our office at [phone]. Be sure the form has your name and date of birth on it.  The form will be faxed to our Forms Department and they will complete it for you.  There is a 25$ fee for all forms that need to be filled out.  Please be aware there is a 10-14 day turnaround time.  You will need to sign a release of information (SISI) form if your paperwork does not come with one.  You may call the Forms Department with any questions at [phone].  Their fax number is [phone].

## 2024-06-05 ENCOUNTER — HOSPITAL ENCOUNTER (OUTPATIENT)
Dept: MRI IMAGING | Age: 34
Discharge: HOME | End: 2024-06-05
Attending: Other
Payer: COMMERCIAL

## 2024-06-05 DIAGNOSIS — R20.2 PARESTHESIA: ICD-10-CM

## 2024-06-05 DIAGNOSIS — R90.89 ABNORMAL FINDING ON MRI OF BRAIN: ICD-10-CM

## 2024-06-05 PROCEDURE — 70553 MRI BRAIN STEM W/O & W/DYE: CPT | Performed by: OTHER

## 2024-06-05 PROCEDURE — A9575 INJ GADOTERATE MEGLUMI 0.1ML: HCPCS | Performed by: OTHER

## 2024-06-05 RX ORDER — GADOTERATE MEGLUMINE 376.9 MG/ML
20 INJECTION INTRAVENOUS
Status: COMPLETED | OUTPATIENT
Start: 2024-06-05 | End: 2024-06-05

## 2024-06-05 RX ADMIN — GADOTERATE MEGLUMINE 17 ML: 376.9 INJECTION INTRAVENOUS at 11:44:00

## 2024-07-22 DIAGNOSIS — F90.9 ATTENTION DEFICIT HYPERACTIVITY DISORDER (ADHD), UNSPECIFIED ADHD TYPE: ICD-10-CM

## 2024-07-22 RX ORDER — DEXTROAMPHETAMINE SACCHARATE, AMPHETAMINE ASPARTATE, DEXTROAMPHETAMINE SULFATE AND AMPHETAMINE SULFATE 2.5; 2.5; 2.5; 2.5 MG/1; MG/1; MG/1; MG/1
10 TABLET ORAL DAILY
Qty: 30 TABLET | Refills: 0 | Status: CANCELLED | OUTPATIENT
Start: 2024-07-22

## 2024-07-23 RX ORDER — DEXTROAMPHETAMINE SACCHARATE, AMPHETAMINE ASPARTATE MONOHYDRATE, DEXTROAMPHETAMINE SULFATE AND AMPHETAMINE SULFATE 2.5; 2.5; 2.5; 2.5 MG/1; MG/1; MG/1; MG/1
10 CAPSULE, EXTENDED RELEASE ORAL EVERY MORNING
Qty: 30 CAPSULE | Refills: 0 | Status: SHIPPED | OUTPATIENT
Start: 2024-07-23 | End: 2024-08-22

## 2024-07-23 NOTE — TELEPHONE ENCOUNTER
Adderal ER 10 mg  Filled 2-7-24  Qty 30  0 refills  Future Appointments   Date Time Provider Department Center   8/12/2024  9:15 AM Wilian Polanco MD [facility] [place]   8/23/2024 11:00 AM Jeremias De Santiago DPM [id number]   9/9/2024  5:00 PM Wilian Polanco MD [facility] [place]   LOV 3-11-24 TO

## 2024-08-14 PROBLEM — F90.9 ATTENTION DEFICIT HYPERACTIVITY DISORDER (ADHD): Status: ACTIVE | Noted: 2024-08-14

## 2024-08-21 NOTE — ANESTHESIA PROCEDURE NOTES
Blood Patch  Performed by: Iza Kang MD  Authorized by: Iza Kang MD     General Information and Staff    Start Time:  3/20/2021 11:10 AM  End Time:  3/20/2021 11:20 AM  Anesthesiologist:  Iza Kang MD  Performed by:   Anesthesi
never

## 2024-08-27 ENCOUNTER — OFFICE VISIT (OUTPATIENT)
Facility: LOCATION | Age: 34
End: 2024-08-27
Payer: COMMERCIAL

## 2024-08-27 DIAGNOSIS — M79.675 GREAT TOE PAIN, LEFT: ICD-10-CM

## 2024-08-27 DIAGNOSIS — L60.0 INGROWING NAIL, LEFT GREAT TOE: Primary | ICD-10-CM

## 2024-08-27 DIAGNOSIS — M79.674 GREAT TOE PAIN, RIGHT: ICD-10-CM

## 2024-08-27 DIAGNOSIS — L60.0 INGROWING NAIL, RIGHT GREAT TOE: ICD-10-CM

## 2024-08-28 NOTE — PROGRESS NOTES
[facility]  Progress Note    Dolores Henning is a 34 year old female.   Chief Complaint   Patient presents with    New Patient     Patient is here for bilateral hallux ingrown nails, medial borders. She rates pain 4/10, she has neuropathy, more in right than left (Covid vaccine).         HPI:     Patient is a pleasant 34-year-old female who is presenting to clinic today with complaints of ingrown toenails to the inside borders of both great toes.  Patient states that she has been dealing with intermittent ingrown toenails for a little while now.  She does get intermittent pain, which does improve if she trims the toenail.  Rates her pain 4/10.  Denies noticing any recent signs of infection.  Patient does have neuropathy and states that it is secondary to the COVID-vaccine.  Denies any other concerns and is here today for further evaluation and care.  Denies recent nausea, vomiting, fevers, chills.      Allergies: Seasonal   Current Outpatient Medications   Medication Sig Dispense Refill    amphetamine-dextroamphetamine 10 MG Oral Tab Take 1 tablet (10 mg total) by mouth daily. 30 tablet 0      Past Medical History:    Anemia    Hx Fe def anemia with pregnancies    Asthma ([facility])    as a child    Chronic rhinitis    Encounter for supervision of other normal pregnancy, unspecified trimester ([facility])    Gastrointestinal disorder    celiac disease    Genitourinary disease    Hx UTIs    History of macrosomia in infant in prior pregnancy, currently pregnant ([facility])    Normal pregnancy ([facility])    Obesity affecting pregnancy, antepartum ([facility])    36w: NST weekly    PONV (postoperative nausea and vomiting)    1 episode    Viral upper respiratory tract infection with cough      Past Surgical History:   Procedure Laterality Date    D&c after delivery  2018      section level i  2023    Hemorrhoidectomy  2019    EXCISION HEMORRHOID           &  &      Family History   Problem  Relation Age of Onset    Cancer Maternal Grandmother 62        breast cancer    Breast Cancer Maternal Grandmother 50    Breast Cancer Mother 55        s/p R mastectomy     Other (Other) Paternal Uncle         Multiple Sclerosis      Social History     Socioeconomic History    Marital status:    Tobacco Use    Smoking status: Never     Passive exposure: Never    Smokeless tobacco: Never   Vaping Use    Vaping status: Never Used   Substance and Sexual Activity    Alcohol use: Never    Drug use: No    Sexual activity: Yes     Birth control/protection: Pill   Other Topics Concern    Caffeine Concern Yes     Comment: 1 cup a day    Exercise No           REVIEW OF SYSTEMS:     10 point ROS completed and was negative, except for pertinent positive and negatives stated in subjective.       EXAM:     GENERAL: well developed, well nourished, in no apparent distress  EXTREMITIES:  1. Integument: Ingrowing toenails noted to medial border bilateral hallux toenails.  There is no surrounding erythema, no purulent drainage, and no other signs of infection to ingrowing toenail sites.  Remaining toenails of bilateral feet appear of normal morphology and adequate length.  Skin appears moist, warm, and supple with positive hair growth. There are no color changes. No open lesions. No macerations. No Hyperkeratotic lesions.   2. Vascular: Dorsalis pedis 2/4 bilateral and posterior tibial pulses 2/4 bilateral, capillary refill normal.  3. Neurological: Gross sensation intact via light touch bilaterally.  Normal sharp/dull sensation  4. Musculoskeletal: Pain with palpation more so to the proximal half of the medial border of bilateral hallux toenails.  Muscle strength testing deferred       ASSESSMENT AND PLAN:   Diagnoses and all orders for this visit:    Ingrowing nail, left great toe    Ingrowing nail, right great toe    Great toe pain, left    Great toe pain, right        Plan:   -Patient was seen and evaluated today in clinic.   Chart history reviewed.    Discussed clinical exam findings with patient, which are consistent with mild ingrown toenails to the medial border bilateral hallux toenails.  They appear uninfected today.    Due to patient's ongoing pain, would recommend partial nail avulsion with or without chemical matrixectomy to medial borders of bilateral hallux toenails.    Will plan to have patient back to perform procedure at the end of this week to allow for more time    Patient should monitor for any signs of infection and seek immediate medical attention if noticing any of the sites    -The patient indicates understanding of these issues and agrees to the plan.    Time spent reviewing pertinent information from patient's chart, reviewing any pertinent imaging, obtaining history and physical exam, discussing and mutually agreeing on a treatment plan, and documenting encounter: 5 minutes    RTC Friday for ingrown toenail procedure to medial border bilateral hallux      Sly eD Santiago DPM        [facility]  [address] [place] [id number]     8/27/2024    Dragon speech recognition software was used to prepare this note.  Errors in word recognition may occur.  Please contact me with any questions/concerns with this note.

## 2024-09-06 DIAGNOSIS — F90.9 ATTENTION DEFICIT HYPERACTIVITY DISORDER (ADHD), UNSPECIFIED ADHD TYPE: ICD-10-CM

## 2024-09-06 RX ORDER — DEXTROAMPHETAMINE SACCHARATE, AMPHETAMINE ASPARTATE, DEXTROAMPHETAMINE SULFATE AND AMPHETAMINE SULFATE 2.5; 2.5; 2.5; 2.5 MG/1; MG/1; MG/1; MG/1
10 TABLET ORAL DAILY
Qty: 30 TABLET | Refills: 0 | Status: SHIPPED | OUTPATIENT
Start: 2024-09-06

## 2024-09-06 NOTE — TELEPHONE ENCOUNTER
Requesting    amphetamine-dextroamphetamine 10 MG Oral Tab         Sig: Take 1 tablet (10 mg total) by mouth daily.    Disp: 30 tablet    Refills: 0    Start: 9/6/2024    Earliest Fill Date: 9/6/2024    Class: Normal    Non-formulary For: Attention deficit hyperactivity disorder (ADHD), unspecified ADHD type    Last ordered: 3 weeks ago (8/14/2024) by Wilian Polanco MD    Controlled Substance Medication Caabdi9209/06/2024 11:35 AM    This medication is a controlled substance - forward to provider to refill        LOV: 8/14/2024  RTC: 6 month   Last Relevant Labs: n/a   Filled: 8/14/224 #30 with 0 refills    Future Appointments   Date Time Provider Department Center   2/10/2025  5:00 PM Wilian Polanco MD [facility] [place]

## 2024-10-11 DIAGNOSIS — F90.9 ATTENTION DEFICIT HYPERACTIVITY DISORDER (ADHD), UNSPECIFIED ADHD TYPE: ICD-10-CM

## 2024-10-11 RX ORDER — DEXTROAMPHETAMINE SACCHARATE, AMPHETAMINE ASPARTATE, DEXTROAMPHETAMINE SULFATE AND AMPHETAMINE SULFATE 2.5; 2.5; 2.5; 2.5 MG/1; MG/1; MG/1; MG/1
10 TABLET ORAL DAILY
Qty: 30 TABLET | Refills: 0 | Status: SHIPPED | OUTPATIENT
Start: 2024-10-11

## 2024-10-11 NOTE — TELEPHONE ENCOUNTER
Requesting    amphetamine-dextroamphetamine 10 MG Oral Tab         Sig: Take 1 tablet (10 mg total) by mouth daily.    Disp: 30 tablet    Refills: 0    Start: 10/11/2024    Earliest Fill Date: 10/11/2024    Class: Normal    Non-formulary For: Attention deficit hyperactivity disorder (ADHD), unspecified ADHD type    Last ordered: 1 month ago (9/6/2024) by Wilian Polanco MD    Controlled Substance Medication Gslazn24/11/2024 11:07 AM    This medication is a controlled substance - forward to provider to refill        LOV: 8/14/2024  RTC: 6 months  Last Relevant Labs: n/a   Filled: 9/6/2024 #30 with 0 refills    Future Appointments   Date Time Provider Department Center   2/10/2025  5:00 PM Wilian Polanco MD [facility] [place]

## 2024-11-11 DIAGNOSIS — F90.9 ATTENTION DEFICIT HYPERACTIVITY DISORDER (ADHD), UNSPECIFIED ADHD TYPE: ICD-10-CM

## 2024-11-11 RX ORDER — DEXTROAMPHETAMINE SACCHARATE, AMPHETAMINE ASPARTATE, DEXTROAMPHETAMINE SULFATE AND AMPHETAMINE SULFATE 2.5; 2.5; 2.5; 2.5 MG/1; MG/1; MG/1; MG/1
10 TABLET ORAL DAILY
Qty: 30 TABLET | Refills: 0 | Status: SHIPPED | OUTPATIENT
Start: 2024-11-11

## 2024-11-11 NOTE — TELEPHONE ENCOUNTER
Requesting    amphetamine-dextroamphetamine 10 MG Oral Tab         Sig: Take 1 tablet (10 mg total) by mouth daily.    Disp: 30 tablet    Refills: 0    Start: 11/11/2024    Earliest Fill Date: 11/11/2024    Class: Normal    Non-formulary For: Attention deficit hyperactivity disorder (ADHD), unspecified ADHD type    Last ordered: 1 month ago (10/11/2024) by Wilian Polanco MD    Controlled Substance Medication Zpvvbv5711/11/2024 03:11 PM    This medication is a controlled substance - forward to provider to refill        LOV: 8/14/2024  RTC: 6 months   Last Relevant Labs: n/a   Filled: 10/11/2024 #30 with 0 refills    Future Appointments   Date Time Provider Department Center   11/13/2024  8:30 AM Wilian Polanco MD [facility] [place]   2/10/2025  5:00 PM Wilian Polanco MD [facility] [place]

## 2024-11-13 NOTE — PROGRESS NOTES
Left message to call back. Time reflects when diagnosis was documented in both MDM as applicable and the Disposition within this note       Time User Action Codes Description Comment    11/13/2024  3:04 AM Juan Diego Arevalo Add [M54.2] Neck pain           ED Disposition       ED Disposition   Discharge    Condition   Stable    Date/Time   Wed Nov 13, 2024  3:04 AM    Comment   Madison Beard discharge to home/self care.                   Assessment & Plan       Medical Decision Making  69 y.o. female presents with years of neck pain that have been worse that typical since awakening on Friday.  Patient notes pain in the posterior neck that radiates to the posterior occiput.  Patient states movement worsens the pain and nothing improves the pain.      Patient affirms a long history of similar pain however the pain over the past few days has been worse than typical.  Patient was was seen previously for this in the [facility] where she had lived and had x-ray imaging demonstrating arthritis.  Patient states she was placed on a medication for this but is unclear as to which medication.    Patient denies any falls or trauma.    Patient denies any concerns for CO exposure.  Patient denies any recent tick bites.  Patient denies any recent cervical manipulation    Patient denies any history of connective tissue disorders.    Patient denies any history or family history of SAH.    Patient denies any history of immunocompromised state.    Patient denies any use of illicit drugs.    Patient denies any nausea or vomiting.    Patient denies any fever or chills.  Patient is afebrile upon arrival to the emergency room.  Patient denies any visual changes.  Patient denies any sensory changes.    Patient denies any focal weakness.      Focused Objective:  Eyes:  PERRL, EOMI.  Normal fundoscopic exam with no signs of papilledema or retinal hemorrhage.  No nystagmus with gaze fixation.  HENT: Atraumatic.  Pharynx moist and non-erythematous.  No tenderness or  swelling over the temporal arteries.  Neck: no carotid bruit, diffusely tender in the posterior aspect to palpitation without focality.  Decreased range of motion secondary to pain but patient is able to touch chin to chest.    Neuro:  Alert and answers questions appropriately.  No dysarthria.  Normal tandem gait, including toe walking and heel walking.  Normal Romberg exam.  No pronator drift.  Normal finger to nose.  Normal fine motor function with rapid finger movements.  Normal hand tap.  Cranial nerves II through XII grossly intact.  Visual fields grossly intact. Upper and lower motor strength 5/5 and symmetric, including handgrip strength.  Normal light touch sensory exam.   Normal DTRs.     Medical Decision Making  Patient presenting with neck pain/headache representing a broad differential.     Fortunately patient's neurologic examination is intact.  Patient denies any falls or trauma however considering her age, discussed risks and benefits of imaging with the patient and will obtain CT imaging of patient's head and neck to evaluate for intracranial or cervical etiology of patient's symptoms though I discussed limitations of CT imaging with the patient.  Patient does not have any focal deficits or findings that would be concerning for CVA and would be well outside the window for any intervention.    Patient has no fever and denies any history of such.  Patient does note a history of diabetes but otherwise denies any immunocompromised state that would be concerning for infectious etiology of her neck pain.    Patient has taken an unclear medication previously for this but has no history of physical therapy.  Patient denies any surgical intervention or any chiropractic interventions that would warrant specific evaluation.    Patient has tenderness raising the possibility of musculoskeletal etiology and she states her symptoms worsen upon awakening.  I discussed with the patient that I do believe physical  therapy would likely be beneficial for her if no findings on imaging that would warrant more urgent evaluation.  I discussed likely diagnostic concern and the need for continue evaluation and possibly additional imaging with MRI if our evaluation was without specific findings.  Discussed symptomatic management and emphasized the need for follow-up and returning to the emergency room and progression or worsening of symptoms and patient affirmed understanding.    Amount and/or Complexity of Data Reviewed  Radiology: ordered.    Risk  Prescription drug management.        ED Course as of 11/14/24 0119   Wed Nov 13, 2024   0303 Patient notes improvement in symptoms following symptomatic management.  Patient recalls that the medication that she has been taking is gabapentin.  Will prescribe course of methocarbamol after discussion of the risks and benefits this medication and I suggested continued use of the ibuprofen that she has been taking for her gout.  Discussed follow-up with comprehensive spine and return to the emergency room question worsening symptoms and she affirmed understanding.       Medications   ketorolac (TORADOL) injection 15 mg (15 mg Intramuscular Given 11/13/24 0028)   methocarbamol (ROBAXIN) tablet 500 mg (500 mg Oral Given 11/13/24 0028)       ED Risk Strat Scores                           SBIRT 22yo+      Flowsheet Row Most Recent Value   Initial Alcohol Screen: US AUDIT-C     1. How often do you have a drink containing alcohol? 0 Filed at: 11/13/2024 0002   2. How many drinks containing alcohol do you have on a typical day you are drinking?  0 Filed at: 11/13/2024 0002   3a. Male UNDER 65: How often do you have five or more drinks on one occasion? 0 Filed at: 11/13/2024 0002   3b. FEMALE Any Age, or MALE 65+: How often do you have 4 or more drinks on one occassion? 0 Filed at: 11/13/2024 0002   Audit-C Score 0 Filed at: 11/13/2024 0002   MERLY: How many times in the past year have you...    Used  an illegal drug or used a prescription medication for non-medical reasons? Never Filed at: 11/13/2024 0002                            History of Present Illness       Chief Complaint   Patient presents with    Neck Pain     Neck and posterior head pain, no inj, worse with movement        Past Medical History:   Diagnosis Date    Diabetes mellitus (HCC)     Hypertension       No past surgical history on file.   No family history on file.       E-Cigarette/Vaping      E-Cigarette/Vaping Substances      I have reviewed and agree with the history as documented.     HPI    Review of Systems        Objective       ED Triage Vitals   Temperature Pulse Blood Pressure Respirations SpO2 Patient Position - Orthostatic VS   11/13/24 0010 11/13/24 0010 11/13/24 0010 11/13/24 0030 11/13/24 0010 11/13/24 0030   97.8 °F (36.6 °C) 88 160/87 18 99 % Lying      Temp src Heart Rate Source BP Location FiO2 (%) Pain Score    -- 11/13/24 0030 11/13/24 0030 -- --     Monitor Right arm        Vitals      Date and Time Temp Pulse SpO2 Resp BP Pain Score FACES Pain Rating User   11/13/24 0030 -- 85 100 % 18 165/93 -- -- AM   11/13/24 0010 97.8 °F (36.6 °C) 88 99 % -- 160/87 -- -- AR            Physical Exam    Results Reviewed       None            CT head without contrast   Final Interpretation by Suhail Pizarro DO (11/13 0250)      No calvarial fracture or acute intracranial abnormality is seen.      Other findings as above.            CT CERVICAL SPINE - WITHOUT CONTRAST      INDICATION:   headache.      COMPARISON:  None.      TECHNIQUE:  CT examination of the cervical spine was performed without intravenous contrast.  Contiguous axial images were obtained. Multiplanar 2D reformatted images were created from the source data.      Radiation dose length product (DLP) for this visit:  797 mGy-cm (accession [id number]), 536 mGy-cm (accession [id number]).  This examination, like all CT scans performed in the [facility],  was performed utilizing techniques to minimize    radiation dose exposure, including the use of iterative reconstruction and automated exposure control.      IMAGE QUALITY:  Diagnostic.      FINDINGS:      ALIGNMENT:  Normal alignment of the cervical spine. No subluxation.      VERTEBRAE: No acute fracture. 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      DEGENERATIVE CHANGES: Intervertebral disc space narrowing at C5/C6, anterior, marginal, and uncovertebral osteophytosis at multiple levels.      PREVERTEBRAL AND PARASPINAL SOFT TISSUES: Grossly unremarkable      THORACIC INLET: Right-sided Mediport, partially imaged; otherwise grossly unremarkable.         IMPRESSION:      Degenerative changes as described but no evidence of acute cervical spine injury.      Other findings as above.            Workstation performed: [id number]         CT spine cervical without contrast   Final Interpretation by Suhail Pizarro DO (11/13 0250)      No calvarial fracture or acute intracranial abnormality is seen.      Other findings as above.            CT CERVICAL SPINE - WITHOUT CONTRAST      INDICATION:   headache.      COMPARISON:  None.      TECHNIQUE:  CT examination of the cervical spine was performed without intravenous contrast.  Contiguous axial images were obtained. Multiplanar 2D reformatted images were created from the source data.      Radiation dose length product (DLP) for this visit:  797 mGy-cm (accession [id number]), 536 mGy-cm (accession [id number]).  This examination, like all CT scans performed in the [facility], was performed utilizing techniques to minimize    radiation dose exposure, including the use of iterative reconstruction and automated exposure control.      IMAGE QUALITY:  Diagnostic.      FINDINGS:      ALIGNMENT:  Normal alignment of the cervical spine. No subluxation.      VERTEBRAE: No acute fracture. 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      DEGENERATIVE  CHANGES: Intervertebral disc space narrowing at C5/C6, anterior, marginal, and uncovertebral osteophytosis at multiple levels.      PREVERTEBRAL AND PARASPINAL SOFT TISSUES: Grossly unremarkable      THORACIC INLET: Right-sided Mediport, partially imaged; otherwise grossly unremarkable.         IMPRESSION:      Degenerative changes as described but no evidence of acute cervical spine injury.      Other findings as above.            Workstation performed: [id number]             Procedures    ED Medication and Procedure Management   None     Discharge Medication List as of 11/13/2024  3:05 AM        START taking these medications    Details   methocarbamol (ROBAXIN) 500 mg tablet Take 1 tablet (500 mg total) by mouth 2 (two) times a day as needed for muscle spasms, Starting Wed 11/13/2024, Print             ED SEPSIS DOCUMENTATION   Time reflects when diagnosis was documented in both MDM as applicable and the Disposition within this note       Time User Action Codes Description Comment    11/13/2024  3:04 AM Juan Diego Arevalo Add [M54.2] Neck pain                  Juan Diego Arevalo MD  11/14/24 0120     Ambulatory

## 2024-12-16 DIAGNOSIS — F90.9 ATTENTION DEFICIT HYPERACTIVITY DISORDER (ADHD), UNSPECIFIED ADHD TYPE: ICD-10-CM

## 2024-12-16 RX ORDER — DEXTROAMPHETAMINE SACCHARATE, AMPHETAMINE ASPARTATE, DEXTROAMPHETAMINE SULFATE AND AMPHETAMINE SULFATE 2.5; 2.5; 2.5; 2.5 MG/1; MG/1; MG/1; MG/1
10 TABLET ORAL DAILY
Qty: 30 TABLET | Refills: 0 | Status: SHIPPED | OUTPATIENT
Start: 2024-12-16

## 2024-12-16 NOTE — TELEPHONE ENCOUNTER
Requesting    amphetamine-dextroamphetamine 10 MG Oral Tab         Sig: Take 1 tablet (10 mg total) by mouth daily.    Disp: 30 tablet    Refills: 0    Start: 12/16/2024    Earliest Fill Date: 12/16/2024    Class: Normal    Non-formulary For: Attention deficit hyperactivity disorder (ADHD), unspecified ADHD type    Last ordered: 1 month ago (11/11/2024) by Wilian Polanco MD    Controlled Substance Medication [id number]/16/2024 04:25 AM    This medication is a controlled substance - forward to provider to refill        LOV: 8/14/2024  RTC: 6 months   Last Relevant Labs: n/a  Filled: 11/11/2024 #30 with 0 refills    Future Appointments   Date Time Provider Department Center   2/10/2025  5:00 PM Wilian Polanco MD [facility] [place]

## 2025-01-20 DIAGNOSIS — F90.9 ATTENTION DEFICIT HYPERACTIVITY DISORDER (ADHD), UNSPECIFIED ADHD TYPE: ICD-10-CM

## 2025-01-20 RX ORDER — DEXTROAMPHETAMINE SACCHARATE, AMPHETAMINE ASPARTATE, DEXTROAMPHETAMINE SULFATE AND AMPHETAMINE SULFATE 2.5; 2.5; 2.5; 2.5 MG/1; MG/1; MG/1; MG/1
10 TABLET ORAL DAILY
Qty: 30 TABLET | Refills: 0 | Status: SHIPPED | OUTPATIENT
Start: 2025-01-20

## 2025-01-20 NOTE — TELEPHONE ENCOUNTER
Requesting   amphetamine-dextroamphetamine 10 MG Oral Tab          Sig: Take 1 tablet (10 mg total) by mouth daily.    Disp: 30 tablet    Refills: 0    Start: 1/20/2025    Earliest Fill Date: 1/20/2025    Class: Normal    Non-formulary For: Attention deficit hyperactivity disorder (ADHD), unspecified ADHD type    Last ordered: 1 month ago (12/16/2024) by Wilian Polanco MD    Controlled Substance Medication [id number]/20/2025 10:36 AM    This medication is a controlled substance - forward to provider to refill    Medication is active on med list        LOV: 8/14/2024  RTC: 6 months  Last Relevant Labs: n/a   Filled: 12/16/2024 #30 with 0 refills    Future Appointments   Date Time Provider Department Center   2/10/2025  5:00 PM Wilian Polanco MD [facility] [place]

## 2025-02-12 DIAGNOSIS — F90.9 ATTENTION DEFICIT HYPERACTIVITY DISORDER (ADHD), UNSPECIFIED ADHD TYPE: ICD-10-CM

## 2025-02-12 RX ORDER — DEXTROAMPHETAMINE SACCHARATE, AMPHETAMINE ASPARTATE, DEXTROAMPHETAMINE SULFATE AND AMPHETAMINE SULFATE 2.5; 2.5; 2.5; 2.5 MG/1; MG/1; MG/1; MG/1
10 TABLET ORAL DAILY
Qty: 30 TABLET | Refills: 0 | Status: SHIPPED | OUTPATIENT
Start: 2025-02-12

## 2025-02-12 NOTE — TELEPHONE ENCOUNTER
Pt requesting medication refill. amphetamine-dextroamphetamine 10 MG Oral Tab  Store #: [facility] #[id number]     Pt has three days worth left.    Future Appointments   Date Time Provider Department Center   2/18/2025  4:00 PM Kalani Fields APRN [facility] [place]

## 2025-02-12 NOTE — TELEPHONE ENCOUNTER
Requesting    amphetamine-dextroamphetamine 10 MG Oral Tab         Sig: Take 1 tablet (10 mg total) by mouth daily.    Disp: 30 tablet    Refills: 0    Start: 2/12/2025    Earliest Fill Date: 2/12/2025    Class: Normal    Non-formulary For: Attention deficit hyperactivity disorder (ADHD), unspecified ADHD type    Last ordered: 3 weeks ago (1/20/2025) by Wilian Polanco MD    Controlled Substance Medication [id number]/12/2025 11:19 AM    This medication is a controlled substance - forward to provider to refill    Medication is active on med list        LOV: 8/14/2024  RTC: 6 months   Last Relevant Labs: n/a   Filled: 1/20/2025 #30 with 0 refills    Future Appointments   Date Time Provider Department Center   2/18/2025  4:00 PM Kalani Fields APRN [facility] [place]

## 2025-02-18 ENCOUNTER — OFFICE VISIT (OUTPATIENT)
Dept: INTERNAL MEDICINE CLINIC | Facility: CLINIC | Age: 35
End: 2025-02-18
Payer: COMMERCIAL

## 2025-02-18 VITALS
BODY MASS INDEX: 32.13 KG/M2 | WEIGHT: 195.19 LBS | SYSTOLIC BLOOD PRESSURE: 106 MMHG | TEMPERATURE: 98 F | RESPIRATION RATE: 14 BRPM | HEART RATE: 86 BPM | OXYGEN SATURATION: 99 % | HEIGHT: 65.35 IN | DIASTOLIC BLOOD PRESSURE: 68 MMHG

## 2025-02-18 DIAGNOSIS — E66.09 CLASS 1 OBESITY DUE TO EXCESS CALORIES WITHOUT SERIOUS COMORBIDITY WITH BODY MASS INDEX (BMI) OF 32.0 TO 32.9 IN ADULT: ICD-10-CM

## 2025-02-18 DIAGNOSIS — M79.671 PAIN OF RIGHT HEEL: ICD-10-CM

## 2025-02-18 DIAGNOSIS — Z00.00 ROUTINE GENERAL MEDICAL EXAMINATION AT A HEALTH CARE FACILITY: Primary | ICD-10-CM

## 2025-02-18 DIAGNOSIS — E55.9 VITAMIN D DEFICIENCY: ICD-10-CM

## 2025-02-18 DIAGNOSIS — E66.811 CLASS 1 OBESITY DUE TO EXCESS CALORIES WITHOUT SERIOUS COMORBIDITY WITH BODY MASS INDEX (BMI) OF 32.0 TO 32.9 IN ADULT: ICD-10-CM

## 2025-02-18 RX ORDER — TIRZEPATIDE 2.5 MG/.5ML
2.5 INJECTION, SOLUTION SUBCUTANEOUS WEEKLY
Qty: 2 ML | Refills: 0 | Status: SHIPPED | OUTPATIENT
Start: 2025-02-18 | End: 2025-03-12

## 2025-02-18 NOTE — PROGRESS NOTES
HPI:   Dolores Henning is a 34 year old female who presents for a complete physical exam. Pt sees gyne for her pap smears and gyne exams. Pt needs a repeat pap smear due to having positive HPV last year. Pt has an appt on 2/24/25. Symptoms: denies discharge, itching, burning or dysuria, periods are regular, flow is 3-4 days. Day 1 and 2 are heavy.    Regular SBE- yes,Sexually active- yes,  Contraception- tubal ligation.     Pt complains of right heel pain. Has had for 1 month ago. Pt states it started after playing volleyball one night. Pt came home and the heel was swollen and tender. The pain is the worst in the morning upon arising. Heels are very sore to walk on in the morning, but get better as the day goes on. Also is worse if a lot of walking is done during the day. Pain level today is 7. The pain is sharp but also an achy throb.  Pt has been taking Ibuprofen and it has helped some.    Pt presents for obesity problem.  Pt has it for: the past 5 years. The weight came on with the last few pregnancies. Pt has tried Phentermine in the past but can no longer take it because she is on the Adderall for her ADD. Discussed the GLP-1's with Pt. Pt would like to try them, but is not sure whether or not it will be covered by her insurance. If not will consider the compounded semaglutide.  Risk factors: sedentary life style.  Makes better: low calories diet and physical activities.  Makes worse: sugar craving and no exercise.      Screening:  Immunization History   Administered Date(s) Administered    Covid-19 Vaccine Pfizer 30 mcg/0.3 ml 01/22/2021, 02/12/2021    FLUZONE 6 months and older PFS 0.5 ml (17451) 10/30/2023    Flublok Quad Influenza Vaccine (66915) 10/26/2022    Influenza 09/21/2024    TDAP 06/06/2019, 08/27/2020   Deferred Date(s) Deferred    FLULAVAL 6 months & older 0.5 ml Prefilled syringe (26060) 11/07/2020     Tdap - 8/27/20   Menarche- 13 yr  PAP- 2/13/24  Any abnormal pap smears- yes HPV,  2023  Pregnancies- 5, Live births- 4  Mammogram-  n/a   Any breast cancer- maternal grandmother and mother, or any gynecological cancer- none, any cancers none  Labs- 8/7/23  DEXA over 65yr- n/a  Flu shot-  9/21/24  Colon- n/a  Glasses/contacts- none, last exam- ? High school  Dental visits- 1/2025    Immunization History   Administered Date(s) Administered    Covid-19 Vaccine Pfizer 30 mcg/0.3 ml 01/22/2021, 02/12/2021    FLUZONE 6 months and older PFS 0.5 ml (96523) 10/30/2023    Flublok Quad Influenza Vaccine (56304) 10/26/2022    Influenza 09/21/2024    TDAP 06/06/2019, 08/27/2020   Deferred Date(s) Deferred    FLULAVAL 6 months & older 0.5 ml Prefilled syringe (09816) 11/07/2020     Wt Readings from Last 6 Encounters:   02/18/25 195 lb 3.2 oz (88.5 kg)   08/14/24 189 lb 12.8 oz (86.1 kg)   05/10/24 193 lb (87.5 kg)   03/11/24 193 lb 9.6 oz (87.8 kg)   02/07/24 192 lb (87.1 kg)   12/27/23 210 lb (95.3 kg)     Body mass index is 32.13 kg/m².     Lab Results   Component Value Date    GLU 80 04/15/2023    GLU 95 07/13/2021    GLU 79 05/26/2017     Lab Results   Component Value Date    CHOLEST 170 04/15/2023    CHOLEST 176 05/26/2017    CHOLEST 140 09/04/2014     Lab Results   Component Value Date    HDL 47 04/15/2023    HDL 56 05/26/2017    HDL 43 (L) 09/04/2014     Lab Results   Component Value Date     (H) 04/15/2023     05/26/2017    LDL 87 09/04/2014     Lab Results   Component Value Date    AST 12 (L) 04/15/2023    AST 13 (L) 07/13/2021    AST 11 (L) 05/26/2017     Lab Results   Component Value Date    ALT 25 04/15/2023    ALT 50 07/13/2021    ALT 23 05/26/2017       Current Outpatient Medications   Medication Sig Dispense Refill    Cholecalciferol 125 MCG (5000 UT) Oral Tab Take 1 tablet (5,000 Units total) by mouth daily.      amphetamine-dextroamphetamine 10 MG Oral Tab Take 1 tablet (10 mg total) by mouth daily. 30 tablet 0      Past Medical History:    Anemia    Hx Fe def anemia with  pregnancies    Asthma (HCC)    as a child    Chronic rhinitis    Encounter for supervision of other normal pregnancy, unspecified trimester (HCC)    Gastrointestinal disorder    celiac disease    Genitourinary disease    Hx UTIs    History of macrosomia in infant in prior pregnancy, currently pregnant (HCC)    Normal pregnancy (HCC)    Obesity affecting pregnancy, antepartum (HCC)    36w: NST weekly    PONV (postoperative nausea and vomiting)    1 episode    Viral upper respiratory tract infection with cough      Past Surgical History:   Procedure Laterality Date    D&c after delivery  2018    Hc  section level i  2023    Hemorrhoidectomy  2019    EXCISION HEMORRHOID           &  &      Family History   Problem Relation Age of Onset    Cancer Maternal Grandmother 62        breast cancer    Breast Cancer Maternal Grandmother 50    Breast Cancer Mother 55        s/p R mastectomy     Other (Other) Paternal Uncle         Multiple Sclerosis      Social History:   Social History     Socioeconomic History    Marital status:    Tobacco Use    Smoking status: Never     Passive exposure: Never    Smokeless tobacco: Never   Vaping Use    Vaping status: Never Used   Substance and Sexual Activity    Alcohol use: Yes     Comment: occ.    Drug use: No    Sexual activity: Yes   Other Topics Concern    Caffeine Concern Yes     Comment: 1 cup a day    Exercise No   Social History Narrative    ** Merged History Encounter **          Social Drivers of Health     Food Insecurity: No Food Insecurity (2025)    NCSS - Food Insecurity     Worried About Running Out of Food in the Last Year: No     Ran Out of Food in the Last Year: No   Transportation Needs: No Transportation Needs (2025)    NCSS - Transportation     Lack of Transportation: No   Stress: No Stress Concern Present (2023)    Stress     Feeling of Stress : No   Housing Stability: Not At Risk (2025)    NCSS -  Housing/Utilities     Has Housing: Yes     Worried About Losing Housing: No     Unable to Get Utilities: No     Occ: [employer] for [employer]. : yes. Children: 4.   Exercise: 2-3 days per week volleyball  Diet: watches minimally     REVIEW OF SYSTEMS:   GENERAL: feels well otherwise  SKIN: denies any unusual skin lesions  EYES:denies blurred vision or double vision  HEENT: denies nasal congestion, sinus pain or ST  LUNGS: denies shortness of breath with exertion  CARDIOVASCULAR: denies chest pain on exertion  GI: denies abdominal pain,denies heartburn  : denies dysuria, vaginal discharge or itching,periods regular   MUSCULOSKELETAL: see HPI  NEURO: denies headaches  PSYCHE: denies depression or anxiety  HEMATOLOGIC: denies hx of anemia  ENDOCRINE: denies thyroid history  ALL/ASTHMA: denies hx of allergy or asthma    EXAM:   /68 (BP Location: Left arm, Patient Position: Sitting, Cuff Size: adult)   Pulse 86   Temp 98 °F (36.7 °C) (Temporal)   Resp 14   Ht 5' 5.35\" (1.66 m)   Wt 195 lb 3.2 oz (88.5 kg)   LMP 02/14/2025   SpO2 99%   BMI 32.13 kg/m²   Body mass index is 32.13 kg/m².   GENERAL: well developed, well nourished,in no apparent distress  SKIN: no rashes,no suspicious lesions  HEENT: atraumatic, normocephalic,ears and throat are clear  EYES:PERRLA, EOMI, normal optic disk,conjunctiva are clear  NECK: supple,no adenopathy,no bruits  CHEST: no chest tenderness  BREAST:sees gyne  LUNGS: clear to auscultation  CARDIO: RRR without murmur  GI: good BS's,no masses, HSM or tenderness  :see gyne  MUSCULOSKELETAL: back is not tender,FROM of the back  HEEL/Right: swollen, tender, no redness. slight limp noted with gait.  EXTREMITIES: no cyanosis, clubbing or edema  NEURO: Oriented times three,cranial nerves are intact,motor and sensory are grossly intact    ASSESSMENT AND PLAN:   Dolores Henning is a 34 year old female who presents for a complete physical exam.  Health maintenance, will  check fasting Labs.. Pt' s weight is Body mass index is 32.13 kg/m²., recommended low fat diet and aerobic exercise 30 minutes three times weekly.  The patient indicates understanding of these issues and agrees to the plan.  The patient is asked to return for CPX in one year.  1. Routine general medical examination at a health care facility    - Lipid Panel  - Comp Metabolic Panel (14)  - CBC With Differential With Platelet  - Hemoglobin A1C  - TSH W Reflex To Free T4  - Vitamin D; Future    2. Class 1 obesity due to excess calories without serious comorbidity with body mass index (BMI) of 32.0 to 32.9 in adult  - Await PA and see if covered. If not Pt to consider compounded semaglutide.  - Tirzepatide-Weight Management (ZEPBOUND) 2.5 MG/0.5ML Subcutaneous Solution Auto-injector; Inject 2.5 mg into the skin once a week for 4 doses.  Dispense: 2 mL; Refill: 0    3. Pain of right heel  - - Advil or Tylenol for pain  - Icing the area for 10- 20 minutes every few hours  - Daily foot stretching in the mornings   - Wear supportive shoes with good arch support and shock absorbency  - Losing weight if over weight will help healing  - No running on hard or uneven ground. Don't walk or run barefoot  - Avoid activities that stress the feet- prolong standing or walking etc.  - Refer to podiatry for possible steroid injections, orthotics,etc.    4. Vitamin D deficiency  Check lab  - Cholecalciferol 125 MCG (5000 UT) Oral Tab; Take 1 tablet (5,000 Units total) by mouth daily.  - Vitamin D; Future    Encounter Diagnoses   Name Primary?    Routine general medical examination at a health care facility Yes    Class 1 obesity due to excess calories without serious comorbidity with body mass index (BMI) of 32.0 to 32.9 in adult     Pain of right heel        Orders Placed This Encounter   Procedures    Lipid Panel    Comp Metabolic Panel (14)    CBC With Differential With Platelet    Hemoglobin A1C    TSH W Reflex To Free T4    Vitamin  D       Meds & Refills for this Visit:  Requested Prescriptions     Signed Prescriptions Disp Refills    Tirzepatide-Weight Management (ZEPBOUND) 2.5 MG/0.5ML Subcutaneous Solution Auto-injector 2 mL 0     Sig: Inject 2.5 mg into the skin once a week for 4 doses.       Imaging & Consults:  None

## 2025-03-10 DIAGNOSIS — F90.9 ATTENTION DEFICIT HYPERACTIVITY DISORDER (ADHD), UNSPECIFIED ADHD TYPE: ICD-10-CM

## 2025-03-10 RX ORDER — DEXTROAMPHETAMINE SACCHARATE, AMPHETAMINE ASPARTATE, DEXTROAMPHETAMINE SULFATE AND AMPHETAMINE SULFATE 2.5; 2.5; 2.5; 2.5 MG/1; MG/1; MG/1; MG/1
10 TABLET ORAL DAILY
Qty: 30 TABLET | Refills: 0 | Status: SHIPPED | OUTPATIENT
Start: 2025-03-10

## 2025-03-10 NOTE — TELEPHONE ENCOUNTER
Requesting   amphetamine-dextroamphetamine 10 MG Oral Tab          Sig: Take 1 tablet (10 mg total) by mouth daily.    Disp: 30 tablet    Refills: 0    Start: 3/10/2025    Earliest Fill Date: 3/10/2025    Class: Normal    Non-formulary For: Attention deficit hyperactivity disorder (ADHD), unspecified ADHD type    Last ordered: 3 weeks ago (2/12/2025) by Wilian Polanco MD    Controlled Substance Medication Iwxyof96/10/2025 01:01 PM    This medication is a controlled substance - forward to provider to refill    Medication is active on med list        LOV: 2/18/2025  RTC: none noted   Last Relevant Labs: n/a  Filled: 2/12/2025 #30 with 0 refills    Future Appointments   Date Time Provider Department Center   3/25/2025  4:30 PM Jeremias De Santiago DPM [id number]

## 2025-03-25 ENCOUNTER — OFFICE VISIT (OUTPATIENT)
Facility: LOCATION | Age: 35
End: 2025-03-25
Payer: COMMERCIAL

## 2025-03-25 VITALS
BODY MASS INDEX: 32.1 KG/M2 | WEIGHT: 195 LBS | HEIGHT: 65.35 IN | SYSTOLIC BLOOD PRESSURE: 118 MMHG | DIASTOLIC BLOOD PRESSURE: 80 MMHG

## 2025-03-25 DIAGNOSIS — M25.374 FOOT JOINT INSTABILITY, RIGHT: ICD-10-CM

## 2025-03-25 DIAGNOSIS — M72.2 PLANTAR FASCIITIS OF RIGHT FOOT: Primary | ICD-10-CM

## 2025-03-25 DIAGNOSIS — M62.461 GASTROCNEMIUS EQUINUS OF RIGHT LOWER EXTREMITY: ICD-10-CM

## 2025-03-25 DIAGNOSIS — M25.375 FOOT JOINT INSTABILITY, LEFT: ICD-10-CM

## 2025-03-25 DIAGNOSIS — M62.462 GASTROCNEMIUS EQUINUS OF LEFT LOWER EXTREMITY: ICD-10-CM

## 2025-03-25 DIAGNOSIS — M79.671 PAIN OF RIGHT HEEL: ICD-10-CM

## 2025-03-25 PROCEDURE — 3008F BODY MASS INDEX DOCD: CPT | Performed by: PODIATRIST

## 2025-03-25 PROCEDURE — 99203 OFFICE O/P NEW LOW 30 MIN: CPT | Performed by: PODIATRIST

## 2025-03-25 PROCEDURE — 20550 NJX 1 TENDON SHEATH/LIGAMENT: CPT | Performed by: PODIATRIST

## 2025-03-25 PROCEDURE — 3079F DIAST BP 80-89 MM HG: CPT | Performed by: PODIATRIST

## 2025-03-25 PROCEDURE — 3074F SYST BP LT 130 MM HG: CPT | Performed by: PODIATRIST

## 2025-03-25 RX ORDER — DEXAMETHASONE SODIUM PHOSPHATE 10 MG/ML
10 INJECTION, SOLUTION INTRA-ARTICULAR; INTRALESIONAL; INTRAMUSCULAR; INTRAVENOUS; SOFT TISSUE ONCE
Status: COMPLETED | OUTPATIENT
Start: 2025-03-25 | End: 2025-03-25

## 2025-03-25 RX ADMIN — DEXAMETHASONE SODIUM PHOSPHATE 10 MG: 10 INJECTION, SOLUTION INTRA-ARTICULAR; INTRALESIONAL; INTRAMUSCULAR; INTRAVENOUS; SOFT TISSUE at 17:25:00

## 2025-03-25 NOTE — PROGRESS NOTES
[facility]  Progress Note      Dolores Henning is a 34 year old female.   Chief Complaint   Patient presents with    Foot Pain     Consult - right foot plantar fasciitis - onset  but has gotten worse in the last 2 months - pain rated 7/10 constant - was seen by PCP          HPI:     Patient is a pleasant 34-year-old female who is presenting to clinic today with concerns of possible right foot plantar fasciitis.  Patient states that she has had this intermittent pain since 2019.  She states that over the last 2 months, she has noticed that the pain has progressively worsened.  She is a  and notices with increased activity the pain worsens.  She states that the pain is usually a constant 7/10.  She is hoping to have this evaluated today.  Denies recent injuries.  No other concerns.      Allergies: Seasonal   Current Outpatient Medications   Medication Sig Dispense Refill    amphetamine-dextroamphetamine 10 MG Oral Tab Take 1 tablet (10 mg total) by mouth daily. 30 tablet 0    Cholecalciferol 125 MCG (5000 UT) Oral Tab Take 1 tablet (5,000 Units total) by mouth daily.        Past Medical History:    Anemia    Hx Fe def anemia with pregnancies    Asthma ([facility])    as a child    Chronic rhinitis    Encounter for supervision of other normal pregnancy, unspecified trimester ([facility])    Gastrointestinal disorder    celiac disease    Genitourinary disease    Hx UTIs    History of macrosomia in infant in prior pregnancy, currently pregnant ([facility])    Normal pregnancy ([facility])    Obesity affecting pregnancy, antepartum ([facility])    36w: NST weekly    PONV (postoperative nausea and vomiting)    1 episode    Viral upper respiratory tract infection with cough      Past Surgical History:   Procedure Laterality Date    D&c after delivery  2018    Hc  section level i  2023    Hemorrhoidectomy  2019    EXCISION HEMORRHOID           &  &      Family History   Problem  Relation Age of Onset    Cancer Maternal Grandmother 62        breast cancer    Breast Cancer Maternal Grandmother 50    Breast Cancer Mother 55        s/p R mastectomy     Other (Other) Paternal Uncle         Multiple Sclerosis      Social History     Socioeconomic History    Marital status:    Tobacco Use    Smoking status: Never     Passive exposure: Never    Smokeless tobacco: Never   Vaping Use    Vaping status: Never Used   Substance and Sexual Activity    Alcohol use: Yes     Comment: occ.    Drug use: No    Sexual activity: Yes   Other Topics Concern    Caffeine Concern Yes     Comment: 1 cup a day    Exercise No           REVIEW OF SYSTEMS:     10 point ROS completed and was negative unless stated in HPI.      EXAM:     Right lower extremity focused exam:  GENERAL: well developed, well nourished, in no apparent distress  EXTREMITIES:  1. Integument: Skin appears moist, warm, and supple with positive hair growth. There are no color changes. No open lesions. No macerations. No Hyperkeratotic lesions.   2. Vascular: Dorsalis pedis 2/4 bilateral and posterior tibial pulses 2/4 bilateral, capillary refill normal.  3. Neurological: Gross sensation intact via light touch bilaterally.  Normal sharp/dull sensation  4. Musculoskeletal: Pain with palpation to plantar central and plantar medial aspects of the right heel.  There is no pain with palpation into the medial longitudinal arch, which does have a palpably taut plantar fascia.  Decreased ankle joint with the knee extended, which does improve with the knee flexed consistent with equinus deformity.  All muscle groups are graded 5/5 in the foot and ankle.       ASSESSMENT AND PLAN:   Diagnoses and all orders for this visit:    Plantar fasciitis of right foot  -     OP REFERRAL TO [facility]  -     Dexamethasone (Decadron) 10 MG/ML injection 10 mg  -     triamcinolone acetonide (Kenalog-10) 10 mg/mL injection  -     INJ  TENDON/LIGAMENT/CYST    Gastrocnemius equinus of right lower extremity  -     OP REFERRAL TO [facility]    Gastrocnemius equinus of left lower extremity  -     OP REFERRAL TO [facility]    Foot joint instability, left  -     OP REFERRAL TO [facility]    Foot joint instability, right  -     OP REFERRAL TO [facility]    Pain of right heel  -     OP REFERRAL TO [facility]        Plan:   -Patient was seen and evaluated today in clinic.  Chart history reviewed.    -I have reviewed patient's chart, clinical findings and diagnosis related to condition with the patient in detail.  -Basic mechanical principles reviewed. Discussed potential etiology and treatment options.  -Explained that the plantar fascia is a connective tissue/ligament on the bottom of the foot.   -This is an overuse injury and progress is often slow/gradual.   -Discussed importance of managing the inflammation and biomechanical issues as well as the importance of adhering to the conservative treatment instructions given.  -Pt educated and given a handout on proper footwear and importance of supportive shoes.   -Pt demonstrated and given handout with recommended home stretching routine.    -I have recommended OTC supplemental arch supports such as Spenco/Powerstep/Redithotics prefab orthotics.  Can look into custom orthotic options in the future.  -We discussed options for OTC vs. Prescriptions NSAID's and GI precautions reviewed.  -Recommendations for ice/gel pack/frozen water bottle to affected area 2-3 times daily and especially after activity or when symptoms are most prevalent.   -Modify activity according to tolerance of pain/symptoms.  -Additionally, cortisone injections, physical therapy, and immobilization can be considered.  -Surgical intervention may be considered if all conservative measures fail to resolve patient's symptoms.  -Patient does  not like to move forward with formal physical therapy.  A referral was placed today.  Injections series: First injection today (3/25/2025)    Plantar fascia injection (CPT: 97579): Right plantar fascia  -Discussed steroid injection with patient including benefits and risks.  Patient agrees to injection and written consent was obtained.  -After prepping site with alcohol, administered steroid injection to the plantar medial aspect of the right heel consisting of 1 cc of 0.5% Marcaine plain, 1 cc of Kenalog 10, and 1 cc of dexamethasone.  Patient tolerated the injection well and there were no complications.  Site was dressed with Band-Aid.      -All of the patient's questions and concerns were addressed.  They indicated their understanding of these issues and agrees to the plan.    RTC 4-6 weeks    Sly De Santiago DPM, AACFAS        3/25/2025    [facility] [place]  [address] [place] [id number]   [email or web address]            Dragon speech recognition software was used to prepare this note.  Errors in word recognition may occur.  Please contact me with any questions/concerns with this note.

## 2025-03-31 NOTE — TELEPHONE ENCOUNTER
----- Message from Lila Ramirez DO sent at 3/29/2025  8:06 PM CDT -----  Lamotrigine level okay   SM would you like to refer to a different provider or have patient call her insurance?

## 2025-04-03 ENCOUNTER — LAB ENCOUNTER (OUTPATIENT)
Dept: LAB | Age: 35
End: 2025-04-03
Attending: FAMILY MEDICINE
Payer: COMMERCIAL

## 2025-04-03 DIAGNOSIS — E55.9 VITAMIN D DEFICIENCY: ICD-10-CM

## 2025-04-03 DIAGNOSIS — R74.8 ELEVATED LIVER ENZYMES: Primary | ICD-10-CM

## 2025-04-03 DIAGNOSIS — Z00.00 ROUTINE GENERAL MEDICAL EXAMINATION AT A HEALTH CARE FACILITY: ICD-10-CM

## 2025-04-03 LAB
ALBUMIN SERPL-MCNC: 4.7 G/DL (ref 3.2–4.8)
ALBUMIN/GLOB SERPL: 2.4 {RATIO} (ref 1–2)
ALP LIVER SERPL-CCNC: 50 U/L
ALT SERPL-CCNC: 115 U/L
ANION GAP SERPL CALC-SCNC: 8 MMOL/L (ref 0–18)
AST SERPL-CCNC: 44 U/L (ref ?–34)
BASOPHILS # BLD AUTO: 0.02 X10(3) UL (ref 0–0.2)
BASOPHILS NFR BLD AUTO: 0.3 %
BILIRUB SERPL-MCNC: 0.5 MG/DL (ref 0.3–1.2)
BUN BLD-MCNC: 10 MG/DL (ref 9–23)
CALCIUM BLD-MCNC: 10.1 MG/DL (ref 8.7–10.6)
CHLORIDE SERPL-SCNC: 104 MMOL/L (ref 98–112)
CHOLEST SERPL-MCNC: 148 MG/DL (ref ?–200)
CO2 SERPL-SCNC: 29 MMOL/L (ref 21–32)
CREAT BLD-MCNC: 0.75 MG/DL
EGFRCR SERPLBLD CKD-EPI 2021: 108 ML/MIN/1.73M2 (ref 60–?)
EOSINOPHIL # BLD AUTO: 0.07 X10(3) UL (ref 0–0.7)
EOSINOPHIL NFR BLD AUTO: 1.2 %
ERYTHROCYTE [DISTWIDTH] IN BLOOD BY AUTOMATED COUNT: 12.5 %
EST. AVERAGE GLUCOSE BLD GHB EST-MCNC: 111 MG/DL (ref 68–126)
FASTING PATIENT LIPID ANSWER: YES
FASTING STATUS PATIENT QL REPORTED: YES
GLOBULIN PLAS-MCNC: 2 G/DL (ref 2–3.5)
GLUCOSE BLD-MCNC: 91 MG/DL (ref 70–99)
HBA1C MFR BLD: 5.5 % (ref ?–5.7)
HCT VFR BLD AUTO: 37 %
HDLC SERPL-MCNC: 43 MG/DL (ref 40–59)
HGB BLD-MCNC: 12.5 G/DL
IMM GRANULOCYTES # BLD AUTO: 0.02 X10(3) UL (ref 0–1)
IMM GRANULOCYTES NFR BLD: 0.3 %
LDLC SERPL CALC-MCNC: 88 MG/DL (ref ?–100)
LYMPHOCYTES # BLD AUTO: 1.77 X10(3) UL (ref 1–4)
LYMPHOCYTES NFR BLD AUTO: 30.5 %
MCH RBC QN AUTO: 29.7 PG (ref 26–34)
MCHC RBC AUTO-ENTMCNC: 33.8 G/DL (ref 31–37)
MCV RBC AUTO: 87.9 FL
MONOCYTES # BLD AUTO: 0.49 X10(3) UL (ref 0.1–1)
MONOCYTES NFR BLD AUTO: 8.4 %
NEUTROPHILS # BLD AUTO: 3.44 X10 (3) UL (ref 1.5–7.7)
NEUTROPHILS # BLD AUTO: 3.44 X10(3) UL (ref 1.5–7.7)
NEUTROPHILS NFR BLD AUTO: 59.3 %
NONHDLC SERPL-MCNC: 105 MG/DL (ref ?–130)
OSMOLALITY SERPL CALC.SUM OF ELEC: 291 MOSM/KG (ref 275–295)
PLATELET # BLD AUTO: 168 10(3)UL (ref 150–450)
POTASSIUM SERPL-SCNC: 4.5 MMOL/L (ref 3.5–5.1)
PROT SERPL-MCNC: 6.7 G/DL (ref 5.7–8.2)
RBC # BLD AUTO: 4.21 X10(6)UL
SODIUM SERPL-SCNC: 141 MMOL/L (ref 136–145)
TRIGL SERPL-MCNC: 93 MG/DL (ref 30–149)
TSI SER-ACNC: 1.57 UIU/ML (ref 0.55–4.78)
VIT D+METAB SERPL-MCNC: 42.4 NG/ML (ref 30–100)
VLDLC SERPL CALC-MCNC: 15 MG/DL (ref 0–30)
WBC # BLD AUTO: 5.8 X10(3) UL (ref 4–11)

## 2025-04-03 PROCEDURE — 82306 VITAMIN D 25 HYDROXY: CPT | Performed by: FAMILY MEDICINE

## 2025-04-03 PROCEDURE — 83036 HEMOGLOBIN GLYCOSYLATED A1C: CPT | Performed by: FAMILY MEDICINE

## 2025-04-03 PROCEDURE — 80050 GENERAL HEALTH PANEL: CPT | Performed by: FAMILY MEDICINE

## 2025-04-03 PROCEDURE — 80061 LIPID PANEL: CPT | Performed by: FAMILY MEDICINE

## 2025-04-10 DIAGNOSIS — F90.9 ATTENTION DEFICIT HYPERACTIVITY DISORDER (ADHD), UNSPECIFIED ADHD TYPE: ICD-10-CM

## 2025-04-10 RX ORDER — DEXTROAMPHETAMINE SACCHARATE, AMPHETAMINE ASPARTATE, DEXTROAMPHETAMINE SULFATE AND AMPHETAMINE SULFATE 2.5; 2.5; 2.5; 2.5 MG/1; MG/1; MG/1; MG/1
10 TABLET ORAL DAILY
Qty: 30 TABLET | Refills: 0 | Status: SHIPPED | OUTPATIENT
Start: 2025-04-10

## 2025-04-10 NOTE — TELEPHONE ENCOUNTER
Amphetamine-dextroamphetamine 10 mg  Filled 3-10-25  Qty 30  0 refills  No future appointments.  LOV 2-18-25 SM

## 2025-05-16 DIAGNOSIS — F90.9 ATTENTION DEFICIT HYPERACTIVITY DISORDER (ADHD), UNSPECIFIED ADHD TYPE: ICD-10-CM

## 2025-05-17 RX ORDER — DEXTROAMPHETAMINE SACCHARATE, AMPHETAMINE ASPARTATE, DEXTROAMPHETAMINE SULFATE AND AMPHETAMINE SULFATE 2.5; 2.5; 2.5; 2.5 MG/1; MG/1; MG/1; MG/1
10 TABLET ORAL DAILY
Qty: 30 TABLET | Refills: 0 | Status: SHIPPED | OUTPATIENT
Start: 2025-05-17

## 2025-05-20 ENCOUNTER — OFFICE VISIT (OUTPATIENT)
Facility: LOCATION | Age: 35
End: 2025-05-20
Payer: COMMERCIAL

## 2025-05-20 VITALS — SYSTOLIC BLOOD PRESSURE: 104 MMHG | DIASTOLIC BLOOD PRESSURE: 60 MMHG

## 2025-05-20 DIAGNOSIS — M79.672 LEFT FOOT PAIN: ICD-10-CM

## 2025-05-20 DIAGNOSIS — B07.0 PLANTAR WART OF LEFT FOOT: Primary | ICD-10-CM

## 2025-05-20 PROCEDURE — 99213 OFFICE O/P EST LOW 20 MIN: CPT | Performed by: PODIATRIST

## 2025-05-20 PROCEDURE — 17110 DESTRUCTION B9 LES UP TO 14: CPT | Performed by: PODIATRIST

## 2025-05-20 NOTE — PROGRESS NOTES
The following individual(s) verbally consented to be recorded using ambient AI listening technology and understand that they can each withdraw their consent to this listening technology at any point by asking the clinician to turn off or pause the recording:    Patient name: Dolores James Milady  Additional names:

## 2025-05-20 NOTE — PROGRESS NOTES
[facility]  Progress Note      Dolores Henning is a 35 year old female.   Chief Complaint   Patient presents with    Warts     Left foot wart  Pain: 3/10  Patient has tried over the counter bandaids and freeze medication but nothing has gotten rid of the wart         HPI:     The following individual(s) verbally consented to be recorded using ambient AI listening technology and understand that they can each withdraw their consent to this listening technology at any point by asking the clinician to turn off or pause the recording:      History of Present Illness  Dolores Henning is a 35 year old female who presents with a wart on her left foot.    She developed the wart three days after receiving a pedicure where a grater tool was used, which she had never used before due to finding it 'gross'.    She has been attempting to treat the wart for six months without success. Her treatments have included cutting it, which results in bleeding, and freezing it multiple times.    She describes the wart as stubborn and difficult to remove, questioning if she is usually 'this hard to get rid of'.  Denies recent signs of infection.        Allergies: Seasonal   Current Medications[1]   Past Medical History[2]   Past Surgical History[3]   Family History[4]   Social Hx on file[5]        REVIEW OF SYSTEMS:     10 point ROS completed and was negative unless stated in HPI.      EXAM:     Left lower extremity focused exam:  GENERAL: well developed, well nourished, in no apparent distress  EXTREMITIES:  1. Integument: Skin appears moist, warm, and supple. There are no color changes. No open lesions. No macerations. No hyperkeratotic lesion noted to plantar left forefoot measuring approximately 0.6 cm x 0.5 cm.  Upon debridement, there is pinpoint bleeding and break in skin lines consistent with verrucoid tissue.  2. Vascular: Dorsalis pedis 2/4 and posterior tibial pulse 2/4, capillary refill normal.  3.  Neurological: Gross sensation intact via light touch.  Normal sharp/dull sensation  4. Musculoskeletal: Discomfort with palpation of hyperkeratotic lesion plantar left foot.  No acute deformities.           ASSESSMENT AND PLAN:   Diagnoses and all orders for this visit:    Plantar wart of left foot    Left foot pain        Plan:   -Patient was seen and evaluated today in clinic.  Chart history reviewed.    Assessment & Plan  Viral wart  Persistent wart on foot, shielded by callus, resistant to cryotherapy. Patient prefers electrocautery (hyfrecation).    Today consented the patient for hyfrecation of lesion reviewing the possible risks and complications associated with the procedure as well as her sequelae should they occur guarantees or assurances were not offered.  Questions were invited and answered consent was signed patient wished to proceed.     Preprocedure diagnoses: verruca plantaris left plantar foot  Postprocedure diagnoses: Same  Procedure: Electrical hyfrecation under local anesthetic lesions as above, left foot     Technique: At today's office visit the patient was consented for the above procedure.  Appropriate timeout was taken and the procedure was carried out as follows:    The area was prepped and draped using usual aseptic technique.  The areas were anesthetized using a total of 3 cc of 1% lidocaine plain and 0.5% Marcaine plain via a infiltrative block proximal and underneath the lesions.  Verruca was then prepped.  Each lesion (if multiple) was shaved down to better identify the excursion of the lesion using a curette.  Utilizing the Hyfrecator and 1 set at 16 W/cm² each was circumferentially hyfrecated from the periphery to the concentric center until curly was noted.  These areas were then debrided utilizing a curette.  And the remaining verrucous tissue was again rehyfrecated at 8 W/cm².  These were again debrided.  1 last treatment was performed at 8 W/cm² and the tissue was debrided.  A  sterile dressing consisting of Bacitracin ointment, and bandaid with Coban was applied.  The patient tolerated the procedure well and left the office in stable condition.     Patient will dress site with Bacitracin/Triple antibiotic and Band-Aid daily.  Can also use moleskin/duck tape.  Provided patient with offloading horseshoe pads to utilize.  Educated patient on acute signs of infection advised patient seek immediate medical attention if any concerns arise.  Patient expressed understanding.      -All of the patient's questions and concerns were addressed.  They indicated their understanding of these issues and agrees to the plan.    Time spent reviewing pertinent information from patient's chart, reviewing any pertinent imaging, obtaining history and physical exam, discussing and mutually agreeing on a treatment plan, and documenting encounter: 25 minutes    RTC 3 weeks    Sly De Santiago DPM, AACFAS        5/20/2025    [address] [id number]   [email or web address]            Dragon speech recognition software was used to prepare this note.  Errors in word recognition may occur.  Please contact me with any questions/concerns with this note.          [1]   Current Outpatient Medications   Medication Sig Dispense Refill    amphetamine-dextroamphetamine 10 MG Oral Tab Take 1 tablet (10 mg total) by mouth daily. 30 tablet 0    Tirzepatide-Weight Management (ZEPBOUND) 2.5 MG/0.5ML Subcutaneous Solution Inject 2.5 mg into the skin once a week. (Patient not taking: Reported on 5/20/2025) 2 mL 0    Tirzepatide-Weight Management (ZEPBOUND) 2.5 MG/0.5ML Subcutaneous Solution Inject 2.5 mg into the skin once a week. (Patient not taking: Reported on 5/20/2025) 2 mL 0    Cholecalciferol 125 MCG (5000 UT) Oral Tab Take 1 tablet (5,000 Units total) by mouth daily.     [2]   Past Medical History:   Anemia    Hx Fe def anemia with pregnancies    Asthma ([facility])    as a  child    Chronic rhinitis    Encounter for supervision of other normal pregnancy, unspecified trimester ([facility])    Gastrointestinal disorder    celiac disease    Genitourinary disease    Hx UTIs    History of macrosomia in infant in prior pregnancy, currently pregnant ([facility])    Normal pregnancy [facility]    Obesity affecting pregnancy, antepartum ([facility])    36w: NST weekly    PONV (postoperative nausea and vomiting)    1 episode    Viral upper respiratory tract infection with cough   [3]   Past Surgical History:  Procedure Laterality Date    D & c  2017    D&c after delivery  2018    Hc  section level i  2023    Hemorrhoidectomy  2019    EXCISION HEMORRHOID           &  &   [4]   Family History  Problem Relation Age of Onset    Cancer Maternal Grandmother 62        breast cancer    Breast Cancer Maternal Grandmother 50    Breast Cancer Mother 55        s/p R mastectomy     Cancer Mother     Other (Other) Paternal Uncle         Multiple Sclerosis   [5]   Social History  Socioeconomic History    Marital status:    Tobacco Use    Smoking status: Never     Passive exposure: Never    Smokeless tobacco: Never   Vaping Use    Vaping status: Never Used   Substance and Sexual Activity    Alcohol use: Yes     Comment: occ.    Drug use: No    Sexual activity: Yes   Other Topics Concern    Caffeine Concern Yes     Comment: 1 cup a day    Exercise No

## 2025-05-20 NOTE — PROGRESS NOTES
Per verbal order from Dr De Santiago, draw up 2.5mls of 1% lidocaine and 2.5mls of Marcaine 0.5%, for wart procedure

## 2025-06-12 DIAGNOSIS — F90.9 ATTENTION DEFICIT HYPERACTIVITY DISORDER (ADHD), UNSPECIFIED ADHD TYPE: ICD-10-CM

## 2025-06-12 RX ORDER — DEXTROAMPHETAMINE SACCHARATE, AMPHETAMINE ASPARTATE, DEXTROAMPHETAMINE SULFATE AND AMPHETAMINE SULFATE 2.5; 2.5; 2.5; 2.5 MG/1; MG/1; MG/1; MG/1
10 TABLET ORAL DAILY
Qty: 30 TABLET | Refills: 0 | Status: SHIPPED | OUTPATIENT
Start: 2025-06-12

## 2025-07-07 ENCOUNTER — OFFICE VISIT (OUTPATIENT)
Dept: INTERNAL MEDICINE CLINIC | Facility: CLINIC | Age: 35
End: 2025-07-07
Payer: COMMERCIAL

## 2025-07-07 VITALS
TEMPERATURE: 99 F | WEIGHT: 188.81 LBS | OXYGEN SATURATION: 99 % | HEART RATE: 77 BPM | BODY MASS INDEX: 31.08 KG/M2 | HEIGHT: 65.35 IN | DIASTOLIC BLOOD PRESSURE: 82 MMHG | SYSTOLIC BLOOD PRESSURE: 120 MMHG

## 2025-07-07 DIAGNOSIS — Z56.6 STRESS AT WORK: Primary | ICD-10-CM

## 2025-07-07 SDOH — HEALTH STABILITY - MENTAL HEALTH: OTHER PHYSICAL AND MENTAL STRAIN RELATED TO WORK: Z56.6

## 2025-07-07 NOTE — PROGRESS NOTES
The following individual(s) verbally consented to be recorded using ambient AI listening technology and understand that they can each withdraw their consent to this listening technology at any point by asking the clinician to turn off or pause the recording:    Patient name: Dolores Henning      History of Present Illness  Dolores Henning is a 35 year old female who presents with work-related stress and anxiety.    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 Her workload has increased dramatically since December, with the addition of over twenty providers to her responsibilities, resulting in a tripling of her workload.   This increase in workload has been overwhelming and has contributed to her stress.    She works from home and has adjusted her schedule to accommodate her children being home for the summer, working three days a week from nine to four to meet the required twenty hours. Despite these adjustments, she finds the workload unmanageable, especially with her children, aged four, five, and one, needing attention during her work hours. She has considered reducing her hours further or quitting but is concerned about the implications once her children return to school.    She has experienced physical symptoms of stress, including crying multiple times a day and feeling unsupported by her workplace. She has communicated her struggles to her manager and has documentation of the workload and her communications.    Physical Exam  Vitals:    07/07/25 1703   BP: 120/82   Pulse: 77   Temp: 98.8 °F (37.1 °C)   TempSrc: Temporal   SpO2: 99%   Weight: 188 lb 12.8 oz (85.6 kg)   Height: 5' 5.35\" (1.66 m)         Body mass index is 31.08 kg/m².    MEASUREMENTS: Height- 5'5\", Weight- 180 lbs.   GENERAL: well developed, well nourished,itearful during the appt        Results      Assessment & Plan  Work-related stress  She experienced  significant stress due to increased workload and managing responsibilities at home. A four-week leave was granted with potential for further evaluation if symptoms persist.  - Provided note for four-week leave of absence effective July 8, 2025, with return on August 5, 2025.   Forms completed as well    - Referred to  Cely Hendrickson for follow-up and counseling.  - Scheduled follow-up appointment on August 4, 2025, to reassess and determine need for further leave.

## 2025-07-14 DIAGNOSIS — F90.9 ATTENTION DEFICIT HYPERACTIVITY DISORDER (ADHD), UNSPECIFIED ADHD TYPE: ICD-10-CM

## 2025-07-15 ENCOUNTER — OFFICE VISIT (OUTPATIENT)
Facility: LOCATION | Age: 35
End: 2025-07-15
Payer: COMMERCIAL

## 2025-07-15 DIAGNOSIS — B07.0 PLANTAR WART OF LEFT FOOT: Primary | ICD-10-CM

## 2025-07-15 DIAGNOSIS — M79.672 LEFT FOOT PAIN: ICD-10-CM

## 2025-07-15 PROCEDURE — 99213 OFFICE O/P EST LOW 20 MIN: CPT | Performed by: PODIATRIST

## 2025-07-15 RX ORDER — DEXTROAMPHETAMINE SACCHARATE, AMPHETAMINE ASPARTATE, DEXTROAMPHETAMINE SULFATE AND AMPHETAMINE SULFATE 2.5; 2.5; 2.5; 2.5 MG/1; MG/1; MG/1; MG/1
10 TABLET ORAL DAILY
Qty: 30 TABLET | Refills: 0 | Status: SHIPPED | OUTPATIENT
Start: 2025-07-15

## 2025-07-24 NOTE — PROGRESS NOTES
[facility]  Progress Note      Dolores Henning is a 35 year old female.   Chief Complaint   Patient presents with    Follow - Up     Follow up on left foot hyfercation         HPI:     The following individual(s) verbally consented to be recorded using ambient AI listening technology and understand that they can each withdraw their consent to this listening technology at any point by asking the clinician to turn off or pause the recording:      History of Present Illness  Dolores Henning is a 35 year old female who presents with a persistent callus and wart on her left foot.  She previously underwent hyfrecation.    She has had a callus on her foot for several months, dating back to around September or November of the previous year. The wart is persistent, with a sensation of something still present towards the toe, despite previous treatments. No pain is associated with the callus.    She has attempted various treatments, including hyfrecation and self-cutting, but these have not been effective.    She has also tried an over-the-counter wart treatment that her brother-in-law used successfully, but it has been ineffective for her. She describes the wart as difficult to treat.      Allergies: Seasonal   Current Medications[1]   Past Medical History[2]   Past Surgical History[3]   Family History[4]   Social Hx on file[5]        REVIEW OF SYSTEMS:     10 point ROS completed and was negative unless stated in HPI.      EXAM:     Left lower extremity focused exam:  GENERAL: well developed, well nourished, in no apparent distress  EXTREMITIES:  1. Integument: Skin appears moist, warm, and supple. There are no color changes. No open lesions. No macerations.  Continued hyperkeratotic lesion noted to plantar left forefoot.  Upon debridement, there is pinpoint bleeding and break in skin lines consistent with continued verrucoid tissue.  2. Vascular: Dorsalis pedis 2/4 and posterior tibial pulse 2/4,  capillary refill normal.  3. Neurological: Gross sensation intact via light touch.  Normal sharp/dull sensation  4. Musculoskeletal: Mild discomfort with palpation of hyperkeratotic lesion plantar left foot.  No acute deformities.             ASSESSMENT AND PLAN:   Diagnoses and all orders for this visit:    Plantar wart of left foot    Left foot pain        Plan:   -Patient was seen and evaluated today in clinic.  Chart history reviewed.    Assessment & Plan  Plantar wart of left foot  Persistent plantar wart with callus formation, resistant to previous treatments, no pain, near resolution.  - Debridement of HPK/verrucoid tissue performed today utilizing curette, confirming continued verrucoid tissue.  - Patient will begin Mediplast, 40% salicylic acid, apply daily.  Discussed appropriate usage  - Advised duct tape over Mediplast to enhance efficacy.  - Follow-up in six weeks.      -All of the patient's questions and concerns were addressed.  They indicated their understanding of these issues and agrees to the plan.      RTC 6 weeks    Sly De Santiago DPM, AACFAS        7/23/2025    [address] [address]   [email or web address]            Dragon speech recognition software was used to prepare this note.  Errors in word recognition may occur.  Please contact me with any questions/concerns with this note.        [1]   Current Outpatient Medications   Medication Sig Dispense Refill    amphetamine-dextroamphetamine 10 MG Oral Tab Take 1 tablet (10 mg total) by mouth daily. 30 tablet 0    Tirzepatide-Weight Management (ZEPBOUND) 2.5 MG/0.5ML Subcutaneous Solution Inject 2.5 mg into the skin once a week. (Patient not taking: Reported on 7/7/2025) 2 mL 0    Tirzepatide-Weight Management (ZEPBOUND) 2.5 MG/0.5ML Subcutaneous Solution Inject 2.5 mg into the skin once a week. (Patient not taking: Reported on 7/7/2025) 2 mL 0    Cholecalciferol 125 MCG (5000 UT)  Oral Tab Take 1 tablet (5,000 Units total) by mouth daily.     [2]   Past Medical History:   Anemia    Hx Fe def anemia with pregnancies    Asthma (HCC)    as a child    Chronic rhinitis    Encounter for supervision of other normal pregnancy, unspecified trimester ([facility])    Gastrointestinal disorder    celiac disease    Genitourinary disease    Hx UTIs    History of macrosomia in infant in prior pregnancy, currently pregnant (HCC)    Normal pregnancy ([facility])    Obesity affecting pregnancy, antepartum ([facility])    36w: NST weekly    PONV (postoperative nausea and vomiting)    1 episode    Viral upper respiratory tract infection with cough   [3]   Past Surgical History:  Procedure Laterality Date    D & c  2017    D&c after delivery  2018    Hc  section level i  2023    Hemorrhoidectomy  2019    EXCISION HEMORRHOID           &  &   [4]   Family History  Problem Relation Age of Onset    Cancer Maternal Grandmother 62        breast cancer    Breast Cancer Maternal Grandmother 50    Breast Cancer Mother 55        s/p R mastectomy     Cancer Mother     Other (Other) Paternal Uncle         Multiple Sclerosis   [5]   Social History  Socioeconomic History    Marital status:    Tobacco Use    Smoking status: Never     Passive exposure: Never    Smokeless tobacco: Never   Vaping Use    Vaping status: Never Used   Substance and Sexual Activity    Alcohol use: Yes     Comment: occ.    Drug use: No    Sexual activity: Yes   Other Topics Concern    Caffeine Concern Yes     Comment: 1 cup a day    Exercise No

## 2025-08-04 ENCOUNTER — OFFICE VISIT (OUTPATIENT)
Dept: INTERNAL MEDICINE CLINIC | Facility: CLINIC | Age: 35
End: 2025-08-04

## 2025-08-04 VITALS
RESPIRATION RATE: 16 BRPM | SYSTOLIC BLOOD PRESSURE: 106 MMHG | WEIGHT: 186.38 LBS | DIASTOLIC BLOOD PRESSURE: 62 MMHG | TEMPERATURE: 98 F | HEART RATE: 80 BPM | BODY MASS INDEX: 31 KG/M2 | OXYGEN SATURATION: 99 %

## 2025-08-04 DIAGNOSIS — Z56.6 STRESS AT WORK: Primary | ICD-10-CM

## 2025-08-04 SDOH — HEALTH STABILITY - MENTAL HEALTH: OTHER PHYSICAL AND MENTAL STRAIN RELATED TO WORK: Z56.6

## 2025-08-12 DIAGNOSIS — F90.9 ATTENTION DEFICIT HYPERACTIVITY DISORDER (ADHD), UNSPECIFIED ADHD TYPE: ICD-10-CM

## 2025-08-13 RX ORDER — DEXTROAMPHETAMINE SACCHARATE, AMPHETAMINE ASPARTATE, DEXTROAMPHETAMINE SULFATE AND AMPHETAMINE SULFATE 2.5; 2.5; 2.5; 2.5 MG/1; MG/1; MG/1; MG/1
10 TABLET ORAL DAILY
Qty: 30 TABLET | Refills: 0 | Status: SHIPPED | OUTPATIENT
Start: 2025-08-13

## 2025-08-27 ENCOUNTER — OFFICE VISIT (OUTPATIENT)
Facility: LOCATION | Age: 35
End: 2025-08-27

## 2025-08-27 DIAGNOSIS — B07.0 PLANTAR WART OF LEFT FOOT: Primary | ICD-10-CM

## 2025-08-27 DIAGNOSIS — M79.672 LEFT FOOT PAIN: ICD-10-CM

## 2025-08-27 PROCEDURE — 17110 DESTRUCTION B9 LES UP TO 14: CPT | Performed by: PODIATRIST

## (undated) DEVICE — SUTURE VICRYL 3-0 SH

## (undated) DEVICE — PANTS KNIT WASHABLE 2/3XL

## (undated) DEVICE — PAD ULNAR NERVE PROTECTOR

## (undated) DEVICE — REM POLYHESIVE ADULT PATIENT RETURN ELECTRODE: Brand: VALLEYLAB

## (undated) DEVICE — STERILE POLYISOPRENE POWDER-FREE SURGICAL GLOVES: Brand: PROTEXIS

## (undated) DEVICE — SOL  .9 1000ML BTL

## (undated) DEVICE — SUTURE VICRYL 2-0 SH

## (undated) DEVICE — NEEDLE SPINAL 22X3-1/2 BLK

## (undated) DEVICE — SPONGE STICK WITH PVP-I: Brand: KENDALL

## (undated) DEVICE — SYRINGE 10ML LL TIP

## (undated) DEVICE — KENDALL SCD EXPRESS SLEEVES, KNEE LENGTH, MEDIUM: Brand: KENDALL SCD

## (undated) DEVICE — PAD SACRAL SPAN AID

## (undated) DEVICE — MINI LAP PACK-LF: Brand: MEDLINE INDUSTRIES, INC.

## (undated) DEVICE — ENSEAL X1 TISSUE SEALER, CURVED JAW, 25 CM SHAFT LENGTH: Brand: ENSEAL

## (undated) DEVICE — ABSORBABLE HEMOSTAT (OXIDIZED REGENERATED CELLULOSE, U.S.P.): Brand: SURGICEL

## (undated) NOTE — LETTER
Patient Name: Hernandez Ruiz  : 5/3/1990  MRN: [id number]  Patient Address: [facility] [address]      Coronavirus Disease 2019 (COVID-19)     [facility] is committed to the safety and well-being of our patients, symptoms carefully. If your symptoms get worse, call your healthcare provider immediately. 3. Get rest and stay hydrated. 4. If you have a medical appointment, call the healthcare provider ahead of time and tell them that you have or may have COVID-19. without the use of fever-reducing medications; and  · Improvement in respiratory symptoms (e.g., cough, shortness of breath); and  · At least 10 days have passed since symptoms first appeared OR if asymptomatic patient or date of symptom onset is unclear t convalescent plasma donors must:    · Have had a confirmed diagnosis of COVID-19  · Be symptom-free for at least 14 days*    *Some people will be required to have a repeat COVID-19 test in order to be eligible to donate.  If you’re instructed by Sadie Odonnell irma

## (undated) NOTE — ED AVS SNAPSHOT
[facility] Emergency Department    [place]  [address] [address]    Phone:  [phone]    Fax:  [address] [place] [address]   MRN: [id number]    Department:  [facility] Emergency Department   Date of Visit:  1/3 IF THERE IS ANY CHANGE OR WORSENING OF YOUR CONDITION, CALL YOUR PRIMARY CARE PHYSICIAN AT ONCE OR RETURN IMMEDIATELY TO THE EMERGENCY DEPARTMENT.     If you have been prescribed any medication(s), please fill your prescription right away and begin taking t

## (undated) NOTE — LETTER
8/11/2021        Martha Espinoza     5/3/1990          To Whom It May Concern:      Martha Dashaausten has been receiving treatment in our office for a medical condition from 8:30 AM -10:00 AM daily, August 9th - August 11th.  Ciara

## (undated) NOTE — LETTER
AUTHORIZATION FOR SURGICAL OPERATION OR OTHER PROCEDURE    1. I hereby authorize Dr. De Santiago, and [facility] staff assigned to my case to perform the following operation and/or procedure at the [facility] site:    ___Wart debridement__________________________________________________________      _______________________________________________________________________________________________    2.  My physician has explained the nature and purpose of the operation or other procedure, possible alternative methods of treatment, the risks involved, and the possibility of complication to me.  I acknowledge that no guarantee has been made as to the result that may be obtained.  3.  I recognize that, during the course of this operation, or other procedure, unforseen conditions may necessitate additional or different procedure than those listed above.  I, therefore, further authorize and request that the above named physician, his/her physician assistants or designees perform such procedures as are, in his/her professional opinion, necessary and desirable.  4.  Any tissue or organs removed in the operation or other procedure may be disposed of by and at the discretion of the [facility] and [facility].  5.  I understand that in the event of a medical emergency, I will be transported by local paramedics to [facility] or other hospital emergency department.  6.  I certify that I have read and fully understand the above consent to operation and/or other procedure.    7.  I acknowledge that my physician has explained sedation/analgesia administration to me including the risks and benefits.  I consent to the administration of sedation/analgesia as may be necessary or desirable in the judgement of my physician.    Witness signature: ___________________________________________________ Date:  ______/______/_____                    Time:  ________ A.M.  P.M.       Patient  Name:  ______________________________________________________  (please print)      Patient signature:  ___________________________________________________             Relationship to Patient:           []  Parent    Responsible person                          []  Spouse  In case of minor or                    [] Other  _____________   Incompetent name:  __________________________________________________                               (please print)      _____________      Responsible person  In case of minor or  Incompetent signature:  _______________________________________________    Statement of Physician  My signature below affirms that prior to the time of the procedure, I have explained to the patient and/or his/her guardian, the risks and benefits involved in the proposed treatment and any reasonable alternative to the proposed treatment.  I have also explained the risks and benefits involved in the refusal of the proposed treatment and have answered the patient's questions.                        Date:  ______/______/_______  Provider                      Signature:  __________________________________________________________       Time:  ___________ A.M    P.M.

## (undated) NOTE — LETTER
Date: 1/16/2023    Patient Name: Jaja Brothers          To Whom it may concern: This letter has been written at the patient's request. The above patient was seen at the [facility] for treatment of a medical condition. This patient should be excused from attending work/school from *** through ***. The patient may return to work/school on *** with the following limitations ***.         Sincerely,    Venkata Shi MD

## (undated) NOTE — LETTER
Date: 8/16/2018    Patient Name: Alcus Dose          To Whom it may concern: This letter has been written at the patient's request. The above patient was seen at the [facility] for treatment of a medical condition.     This patien

## (undated) NOTE — LETTER
Date: 1/16/2023      Patient Name: Jim Flores        To Whom it may concern: The above patient was seen at the [address] [place] [address]. This patient should be excused from attending work until 1/23/23.          Sincerely,    Latanya Fajardo MD

## (undated) NOTE — LETTER
AUTHORIZATION FOR SURGICAL OPERATION OR OTHER PROCEDURE    1. I hereby authorize , and [facility] staff assigned to my case to perform the following operation and/or procedure at the [facility] site:    _______________________________________________________________________________________________    Right foot Cortisone Injection   _______________________________________________________________________________________________    2.  My physician has explained the nature and purpose of the operation or other procedure, possible alternative methods of treatment, the risks involved, and the possibility of complication to me.  I acknowledge that no guarantee has been made as to the result that may be obtained.  3.  I recognize that, during the course of this operation, or other procedure, unforseen conditions may necessitate additional or different procedure than those listed above.  I, therefore, further authorize and request that the above named physician, his/her physician assistants or designees perform such procedures as are, in his/her professional opinion, necessary and desirable.  4.  Any tissue or organs removed in the operation or other procedure may be disposed of by and at the discretion of the [facility] and [facility].  5.  I understand that in the event of a medical emergency, I will be transported by local paramedics to [facility] or other hospital emergency department.  6.  I certify that I have read and fully understand the above consent to operation and/or other procedure.    7.  I acknowledge that my physician has explained sedation/analgesia administration to me including the risks and benefits.  I consent to the administration of sedation/analgesia as may be necessary or desirable in the judgement of my physician.    Witness signature: ___________________________________________________ Date:  ______/______/_____                     Time:  ________ A.M.  P.M.       Patient Name:  ______________________________________________________  (please print)      Patient signature:  ___________________________________________________             Relationship to Patient:           []  Parent    Responsible person                          []  Spouse  In case of minor or                    [] Other  _____________   Incompetent name:  __________________________________________________                               (please print)      _____________      Responsible person  In case of minor or  Incompetent signature:  _______________________________________________    Statement of Physician  My signature below affirms that prior to the time of the procedure, I have explained to the patient and/or his/her guardian, the risks and benefits involved in the proposed treatment and any reasonable alternative to the proposed treatment.  I have also explained the risks and benefits involved in the refusal of the proposed treatment and have answered the patient's questions.                        Date:  ______/______/_______  Provider                      Signature:  __________________________________________________________       Time:  ___________ A.M    P.M.

## (undated) NOTE — ED AVS SNAPSHOT
[facility] Emergency Department    [place]  [address] [address]    Phone:  [phone]    Fax:  [address] [place] [address]   MRN: [id number]    Department:  [facility] Emergency Department   Date of Visit:  1/3 Take 1-2 tablets by mouth every 4 (four) hours as needed for Pain.          CHANGE how you take these medications     Sulfamethoxazole-TMP -160 MG Tabs per tablet   Quantity:  6 tablet   Commonly known as:  BACTRIM DS   Take 1 tablet by mouth 2 (two) You were examined and treated today on an urgent basis only. This was not a substitute for ongoing medical care. Often, one Emergency Department visit does not uncover every injury or illness.  If you have been referred to a primary care or a specialist ph [place] [address] (Puja Bhagat) [address] [facility] [address] [phone] [address]) [phone]                Additional Information       We are concerned

## (undated) NOTE — LETTER
19    Patient: Guerda Lacey  : 5/3/1990 Visit date: 9/3/2019    Dear  CORWIN Queen,    Thank you for referring Guerda Lacey to my practice. Please find my assessment and plan below.            Assessment   External hemorrh

## (undated) NOTE — LETTER
10/03/19    Dear Antonio Reinoso MD,    I am seeing Tonya Quinn in the office for postoperative evaluation and treatment       Assessment   Follow-up examination  (primary encounter diagnosis)  External hemorrhoid, thrombosed  Grade II internal he

## (undated) NOTE — LETTER
11/05/19    Dear Ramez Gaines MD,    I am seeing Katerina Damian in the office for postoperative evaluation and treatment.        Assessment   Follow-up examination  (primary encounter diagnosis)  Grade II internal hemorrhoids      Plan   The patien

## (undated) NOTE — LETTER
Date: 7/7/2025    Patient Name: Dolores Henning          To Whom it may concern:     The above patient was seen at [facility] for treatment of a medical condition.    This patient should be excused from attending work from 7/8/25 through 8/5/25.          Sincerely,      Wilian Polanco MD

## (undated) NOTE — MR AVS SNAPSHOT
[place]  [address] [facility] [address]  [address] [facility] [address] [phone]               Thank you for choosing us for your health care visit with Lev Anthony NP.   We are glad to serve you and happy to provide you with this sum Instructions and Information about Your Health     None      Allergies as of May 22, 2017     No Known Allergies                Today's Vital Signs     BP Pulse Temp Height Weight BMI    110/68 mmHg 56 98.5 °F (36.9 °C) (Oral) 65\" 172 lb 4 oz 28.66 kg/m2

## (undated) NOTE — MR AVS SNAPSHOT
[place]  [address] [facility] [address]  [address] [facility] [address] [phone]               Thank you for choosing us for your health care visit with Niko Juárez MD.  We are glad to serve you and happy to provide you with this s Commonly known as:  BACTRIM DS           TraMADol HCl 50 MG Tabs   Take 1 tablet (50 mg total) by mouth every 6 (six) hours as needed for Pain.    Commonly known as:  ULTRAM           UNKNOWN TO PATIENT - BIRTH CONTROL                Where to Get Your Medic Make half your plate fruits and vegetables Highly refined, white starches including white bread, rice and pasta   Eat plenty of protein, keep the fat content low Sugars:  sodas and sports drinks, candies and desserts   Eat plenty of low-fat dairy products

## (undated) NOTE — LETTER
AUTHORIZATION FOR SURGICAL OPERATION OR OTHER PROCEDURE    1. I hereby authorize Dr. De Santiago, and [facility] staff assigned to my case to perform the following operation and/or procedure at the [facility] site:    _______________________________________________________________________________________________    Ingrown toenail procedure with chemical destruction of the nail root, bilateral hallux medial borders  _______________________________________________________________________________________________    2.  My physician has explained the nature and purpose of the operation or other procedure, possible alternative methods of treatment, the risks involved, and the possibility of complication to me.  I acknowledge that no guarantee has been made as to the result that may be obtained.  3.  I recognize that, during the course of this operation, or other procedure, unforseen conditions may necessitate additional or different procedure than those listed above.  I, therefore, further authorize and request that the above named physician, his/her physician assistants or designees perform such procedures as are, in his/her professional opinion, necessary and desirable.  4.  Any tissue or organs removed in the operation or other procedure may be disposed of by and at the discretion of the [facility] and [facility].  5.  I understand that in the event of a medical emergency, I will be transported by local paramedics to [facility] or other hospital emergency department.  6.  I certify that I have read and fully understand the above consent to operation and/or other procedure.    7.  I acknowledge that my physician has explained sedation/analgesia administration to me including the risks and benefits.  I consent to the administration of sedation/analgesia as may be necessary or desirable in the judgement of my physician.    Witness signature:  ___________________________________________________ Date:  ______/______/_____                    Time:  ________ A.M.  P.M.       Patient Name:  ______________________________________________________  (please print)      Patient signature:  ___________________________________________________             Relationship to Patient:           []  Parent    Responsible person                          []  Spouse  In case of minor or                    [] Other  _____________   Incompetent name:  __________________________________________________                               (please print)      _____________      Responsible person  In case of minor or  Incompetent signature:  _______________________________________________    Statement of Physician  My signature below affirms that prior to the time of the procedure, I have explained to the patient and/or his/her guardian, the risks and benefits involved in the proposed treatment and any reasonable alternative to the proposed treatment.  I have also explained the risks and benefits involved in the refusal of the proposed treatment and have answered the patient's questions.                        Date:  ______/______/_______  Provider                      Signature:  __________________________________________________________       Time:  ___________ MIKAEL KABA